# Patient Record
Sex: MALE | Race: WHITE | NOT HISPANIC OR LATINO
[De-identification: names, ages, dates, MRNs, and addresses within clinical notes are randomized per-mention and may not be internally consistent; named-entity substitution may affect disease eponyms.]

---

## 2019-02-11 PROBLEM — Z00.00 ENCOUNTER FOR PREVENTIVE HEALTH EXAMINATION: Status: ACTIVE | Noted: 2019-02-11

## 2019-02-12 ENCOUNTER — APPOINTMENT (OUTPATIENT)
Dept: OPHTHALMOLOGY | Facility: CLINIC | Age: 84
End: 2019-02-12
Payer: MEDICARE

## 2019-02-12 DIAGNOSIS — H53.2 DIPLOPIA: ICD-10-CM

## 2019-02-12 DIAGNOSIS — Z87.891 PERSONAL HISTORY OF NICOTINE DEPENDENCE: ICD-10-CM

## 2019-02-12 PROCEDURE — 92060 SENSORIMOTOR EXAMINATION: CPT

## 2019-02-12 PROCEDURE — 92015 DETERMINE REFRACTIVE STATE: CPT

## 2019-02-12 PROCEDURE — 99204 OFFICE O/P NEW MOD 45 MIN: CPT

## 2019-02-12 PROCEDURE — V2718: CPT

## 2019-02-12 RX ORDER — POTASSIUM GLUCONATE 575(95)MG
TABLET, EXTENDED RELEASE ORAL
Refills: 0 | Status: ACTIVE | COMMUNITY

## 2019-02-12 RX ORDER — ASPIRIN 325 MG/1
325 TABLET, FILM COATED ORAL
Refills: 0 | Status: ACTIVE | COMMUNITY

## 2019-02-12 RX ORDER — ATORVASTATIN CALCIUM 40 MG/1
40 TABLET, FILM COATED ORAL
Refills: 0 | Status: ACTIVE | COMMUNITY

## 2019-02-12 RX ORDER — METOPROLOL TARTRATE 75 MG/1
TABLET, FILM COATED ORAL
Refills: 0 | Status: ACTIVE | COMMUNITY

## 2019-02-12 RX ORDER — RAMIPRIL 5 MG/1
CAPSULE ORAL
Refills: 0 | Status: ACTIVE | COMMUNITY

## 2019-02-12 RX ORDER — TAMSULOSIN HYDROCHLORIDE 0.4 MG/1
CAPSULE ORAL
Refills: 0 | Status: ACTIVE | COMMUNITY

## 2019-02-12 RX ORDER — FINASTERIDE 5 MG/1
5 TABLET, FILM COATED ORAL
Refills: 0 | Status: ACTIVE | COMMUNITY

## 2019-02-12 RX ORDER — FUROSEMIDE 80 MG/1
TABLET ORAL
Refills: 0 | Status: ACTIVE | COMMUNITY

## 2019-02-12 RX ORDER — UBIDECARENONE/VIT E ACET 100MG-5
25 MCG CAPSULE ORAL
Refills: 0 | Status: ACTIVE | COMMUNITY

## 2019-02-15 LAB
ACRM BINDING ANTIBODY: 0 NMOL/L
MUSK ANTIBODY TEST: 0 NMOL/L

## 2019-02-17 LAB — ACHR BLOCK AB SER QL: <15

## 2019-02-20 LAB — ACHR MOD AB SER-ACNC: 24

## 2020-05-11 ENCOUNTER — EMERGENCY (EMERGENCY)
Facility: HOSPITAL | Age: 85
LOS: 1 days | Discharge: HOME | End: 2020-05-11
Attending: EMERGENCY MEDICINE | Admitting: EMERGENCY MEDICINE
Payer: MEDICARE

## 2020-05-11 VITALS
SYSTOLIC BLOOD PRESSURE: 131 MMHG | HEIGHT: 68 IN | DIASTOLIC BLOOD PRESSURE: 85 MMHG | OXYGEN SATURATION: 94 % | TEMPERATURE: 98 F | RESPIRATION RATE: 16 BRPM | WEIGHT: 149.91 LBS | HEART RATE: 89 BPM

## 2020-05-11 VITALS
SYSTOLIC BLOOD PRESSURE: 128 MMHG | OXYGEN SATURATION: 95 % | DIASTOLIC BLOOD PRESSURE: 77 MMHG | RESPIRATION RATE: 18 BRPM | HEART RATE: 87 BPM

## 2020-05-11 LAB
ALBUMIN SERPL ELPH-MCNC: 3.4 G/DL — SIGNIFICANT CHANGE UP (ref 3.3–5)
ALP SERPL-CCNC: 82 U/L — SIGNIFICANT CHANGE UP (ref 40–120)
ALT FLD-CCNC: 20 U/L — SIGNIFICANT CHANGE UP (ref 12–78)
ANION GAP SERPL CALC-SCNC: 8 MMOL/L — SIGNIFICANT CHANGE UP (ref 5–17)
AST SERPL-CCNC: 29 U/L — SIGNIFICANT CHANGE UP (ref 15–37)
BASOPHILS # BLD AUTO: 0.05 K/UL — SIGNIFICANT CHANGE UP (ref 0–0.2)
BASOPHILS NFR BLD AUTO: 0.5 % — SIGNIFICANT CHANGE UP (ref 0–2)
BILIRUB SERPL-MCNC: 0.7 MG/DL — SIGNIFICANT CHANGE UP (ref 0.2–1.2)
BUN SERPL-MCNC: 37 MG/DL — HIGH (ref 7–23)
CALCIUM SERPL-MCNC: 9.3 MG/DL — SIGNIFICANT CHANGE UP (ref 8.5–10.1)
CHLORIDE SERPL-SCNC: 100 MMOL/L — SIGNIFICANT CHANGE UP (ref 96–108)
CO2 SERPL-SCNC: 32 MMOL/L — HIGH (ref 22–31)
CREAT SERPL-MCNC: 1.3 MG/DL — SIGNIFICANT CHANGE UP (ref 0.5–1.3)
EOSINOPHIL # BLD AUTO: 0.15 K/UL — SIGNIFICANT CHANGE UP (ref 0–0.5)
EOSINOPHIL NFR BLD AUTO: 1.5 % — SIGNIFICANT CHANGE UP (ref 0–6)
GLUCOSE SERPL-MCNC: 102 MG/DL — HIGH (ref 70–99)
HCT VFR BLD CALC: 37.9 % — LOW (ref 39–50)
HGB BLD-MCNC: 11.7 G/DL — LOW (ref 13–17)
IMM GRANULOCYTES NFR BLD AUTO: 0.7 % — SIGNIFICANT CHANGE UP (ref 0–1.5)
LYMPHOCYTES # BLD AUTO: 0.76 K/UL — LOW (ref 1–3.3)
LYMPHOCYTES # BLD AUTO: 7.7 % — LOW (ref 13–44)
MCHC RBC-ENTMCNC: 28.7 PG — SIGNIFICANT CHANGE UP (ref 27–34)
MCHC RBC-ENTMCNC: 30.9 GM/DL — LOW (ref 32–36)
MCV RBC AUTO: 93.1 FL — SIGNIFICANT CHANGE UP (ref 80–100)
MONOCYTES # BLD AUTO: 0.86 K/UL — SIGNIFICANT CHANGE UP (ref 0–0.9)
MONOCYTES NFR BLD AUTO: 8.8 % — SIGNIFICANT CHANGE UP (ref 2–14)
NEUTROPHILS # BLD AUTO: 7.93 K/UL — HIGH (ref 1.8–7.4)
NEUTROPHILS NFR BLD AUTO: 80.8 % — HIGH (ref 43–77)
NRBC # BLD: 0 /100 WBCS — SIGNIFICANT CHANGE UP (ref 0–0)
PLATELET # BLD AUTO: 231 K/UL — SIGNIFICANT CHANGE UP (ref 150–400)
POTASSIUM SERPL-MCNC: 4.4 MMOL/L — SIGNIFICANT CHANGE UP (ref 3.5–5.3)
POTASSIUM SERPL-SCNC: 4.4 MMOL/L — SIGNIFICANT CHANGE UP (ref 3.5–5.3)
PROT SERPL-MCNC: 7.5 G/DL — SIGNIFICANT CHANGE UP (ref 6–8.3)
RBC # BLD: 4.07 M/UL — LOW (ref 4.2–5.8)
RBC # FLD: 15.3 % — HIGH (ref 10.3–14.5)
SODIUM SERPL-SCNC: 140 MMOL/L — SIGNIFICANT CHANGE UP (ref 135–145)
TROPONIN I SERPL-MCNC: 0.02 NG/ML — SIGNIFICANT CHANGE UP (ref 0.01–0.04)
WBC # BLD: 9.82 K/UL — SIGNIFICANT CHANGE UP (ref 3.8–10.5)
WBC # FLD AUTO: 9.82 K/UL — SIGNIFICANT CHANGE UP (ref 3.8–10.5)

## 2020-05-11 PROCEDURE — 84484 ASSAY OF TROPONIN QUANT: CPT

## 2020-05-11 PROCEDURE — 99284 EMERGENCY DEPT VISIT MOD MDM: CPT | Mod: 25

## 2020-05-11 PROCEDURE — 93005 ELECTROCARDIOGRAM TRACING: CPT

## 2020-05-11 PROCEDURE — 93010 ELECTROCARDIOGRAM REPORT: CPT

## 2020-05-11 PROCEDURE — 71045 X-RAY EXAM CHEST 1 VIEW: CPT

## 2020-05-11 PROCEDURE — 80053 COMPREHEN METABOLIC PANEL: CPT

## 2020-05-11 PROCEDURE — 36415 COLL VENOUS BLD VENIPUNCTURE: CPT

## 2020-05-11 PROCEDURE — 73502 X-RAY EXAM HIP UNI 2-3 VIEWS: CPT | Mod: 26,RT

## 2020-05-11 PROCEDURE — 99284 EMERGENCY DEPT VISIT MOD MDM: CPT

## 2020-05-11 PROCEDURE — 70450 CT HEAD/BRAIN W/O DYE: CPT

## 2020-05-11 PROCEDURE — 71045 X-RAY EXAM CHEST 1 VIEW: CPT | Mod: 26

## 2020-05-11 PROCEDURE — 70450 CT HEAD/BRAIN W/O DYE: CPT | Mod: 26

## 2020-05-11 PROCEDURE — 73502 X-RAY EXAM HIP UNI 2-3 VIEWS: CPT

## 2020-05-11 PROCEDURE — 85027 COMPLETE CBC AUTOMATED: CPT

## 2020-05-11 RX ORDER — SODIUM CHLORIDE 9 MG/ML
1000 INJECTION INTRAMUSCULAR; INTRAVENOUS; SUBCUTANEOUS ONCE
Refills: 0 | Status: COMPLETED | OUTPATIENT
Start: 2020-05-11 | End: 2020-05-11

## 2020-05-11 RX ADMIN — SODIUM CHLORIDE 1000 MILLILITER(S): 9 INJECTION INTRAMUSCULAR; INTRAVENOUS; SUBCUTANEOUS at 16:07

## 2020-05-11 NOTE — ED ADULT NURSE NOTE - OBJECTIVE STATEMENT
received pt in bed #t2 Pt A&O BIBA from the [facility] states he was getting up & fell states he did feel slightly dizzy Pt w/ skin tear to right arm Right hip "feels a little tight" CM placed EKG done Pt denies LOC

## 2020-05-11 NOTE — ED PROVIDER NOTE - MUSCULOSKELETAL, MLM
Spine appears normal, range of motion is not limited, no muscle or joint tenderness nrom of hip normal strength sensation pulses + skin tear on right forearm with nrom of elbow

## 2020-05-11 NOTE — ED ADULT NURSE NOTE - NSIMPLEMENTINTERV_GEN_ALL_ED
Implemented All Fall Risk Interventions:  [place] to call system. Call bell, personal items and telephone within reach. Instruct patient to call for assistance. Room bathroom lighting operational. Non-slip footwear when patient is off stretcher. Physically safe environment: no spills, clutter or unnecessary equipment. Stretcher in lowest position, wheels locked, appropriate side rails in place. Provide visual cue, wrist band, yellow gown, etc. Monitor gait and stability. Monitor for mental status changes and reorient to person, place, and time. Review medications for side effects contributing to fall risk. Reinforce activity limits and safety measures with patient and family.

## 2020-05-11 NOTE — ED PROVIDER NOTE - PATIENT PORTAL LINK FT
You can access the FollowMyHealth Patient Portal offered by [facility] by registering at the following website: [email or web address]. By joining [facility]’s FollowMyHealth portal, you will also be able to view your health information using other applications (apps) compatible with our system.

## 2020-05-11 NOTE — ED PROVIDER NOTE - PROGRESS NOTE DETAILS
labs wnl ct head wnl xray + right midlung nodule pt made aware + orthostatics given fluids pt ambulated with walker feeling better stable for d/c

## 2020-05-11 NOTE — ED PROVIDER NOTE - NSFOLLOWUPINSTRUCTIONS_ED_ALL_ED_FT
Drink plenty of fluids follow up with pcp regarding lung nodule. Return to er for any worsening symptoms

## 2020-05-11 NOTE — ED PROVIDER NOTE - CLINICAL SUMMARY MEDICAL DECISION MAKING FREE TEXT BOX
Pt is a 89 yo male s/p fall will get ekg labs ct head cxr and hip xray check orthostatics clean and dress skin tear Pt is a 89 yo male s/p fall will get ekg labs ct head cxr and hip xray check orthostatics clean and dress skin tear  wound cleaned bacitracin and dressing applied

## 2020-05-11 NOTE — ED ADULT NURSE NOTE - PMH
Anxiety    Atrial fibrillation    CAD (coronary artery disease)    High cholesterol    HTN (hypertension)

## 2020-05-11 NOTE — ED PROVIDER NOTE - OBJECTIVE STATEMENT
Pt is a 89 yo male with pmhx of HTN HLD BPH on asa BIBEMS from assisted living s/p fall. Pt states he was sitting got up to grab walker and back of right shoe came off and had slight second of dizziness and fell onto right side no loc no neck pain no abdominal pain no nvd no headache syncope blurry vision no chest pain no sob no numbness tingling no weakness. Pt has tear on right forearm and c/o of right hip tightness. no covid like symptoms

## 2020-05-15 ENCOUNTER — INPATIENT (INPATIENT)
Facility: HOSPITAL | Age: 85
LOS: 6 days | Discharge: HOME | DRG: 536 | End: 2020-05-22
Attending: INTERNAL MEDICINE | Admitting: INTERNAL MEDICINE
Payer: MEDICARE

## 2020-05-15 VITALS
HEIGHT: 68 IN | DIASTOLIC BLOOD PRESSURE: 77 MMHG | OXYGEN SATURATION: 96 % | HEART RATE: 89 BPM | SYSTOLIC BLOOD PRESSURE: 117 MMHG | RESPIRATION RATE: 18 BRPM | TEMPERATURE: 98 F | WEIGHT: 154.98 LBS

## 2020-05-15 DIAGNOSIS — S72.111A DISPLACED FRACTURE OF GREATER TROCHANTER OF RIGHT FEMUR, INITIAL ENCOUNTER FOR CLOSED FRACTURE: ICD-10-CM

## 2020-05-15 LAB
ANION GAP SERPL CALC-SCNC: 8 MMOL/L — SIGNIFICANT CHANGE UP (ref 5–17)
APTT BLD: 31.2 SEC — SIGNIFICANT CHANGE UP (ref 28.5–37)
BASOPHILS # BLD AUTO: 0.05 K/UL — SIGNIFICANT CHANGE UP (ref 0–0.2)
BASOPHILS NFR BLD AUTO: 0.6 % — SIGNIFICANT CHANGE UP (ref 0–2)
BUN SERPL-MCNC: 34 MG/DL — HIGH (ref 7–23)
CALCIUM SERPL-MCNC: 9.2 MG/DL — SIGNIFICANT CHANGE UP (ref 8.5–10.1)
CHLORIDE SERPL-SCNC: 99 MMOL/L — SIGNIFICANT CHANGE UP (ref 96–108)
CO2 SERPL-SCNC: 33 MMOL/L — HIGH (ref 22–31)
CREAT SERPL-MCNC: 1.3 MG/DL — SIGNIFICANT CHANGE UP (ref 0.5–1.3)
EOSINOPHIL # BLD AUTO: 0.15 K/UL — SIGNIFICANT CHANGE UP (ref 0–0.5)
EOSINOPHIL NFR BLD AUTO: 1.8 % — SIGNIFICANT CHANGE UP (ref 0–6)
GLUCOSE SERPL-MCNC: 99 MG/DL — SIGNIFICANT CHANGE UP (ref 70–99)
HCT VFR BLD CALC: 35.4 % — LOW (ref 39–50)
HGB BLD-MCNC: 11.2 G/DL — LOW (ref 13–17)
IMM GRANULOCYTES NFR BLD AUTO: 0.6 % — SIGNIFICANT CHANGE UP (ref 0–1.5)
INR BLD: 1.14 RATIO — SIGNIFICANT CHANGE UP (ref 0.88–1.16)
LYMPHOCYTES # BLD AUTO: 0.8 K/UL — LOW (ref 1–3.3)
LYMPHOCYTES # BLD AUTO: 9.7 % — LOW (ref 13–44)
MCHC RBC-ENTMCNC: 28.9 PG — SIGNIFICANT CHANGE UP (ref 27–34)
MCHC RBC-ENTMCNC: 31.6 GM/DL — LOW (ref 32–36)
MCV RBC AUTO: 91.5 FL — SIGNIFICANT CHANGE UP (ref 80–100)
MONOCYTES # BLD AUTO: 0.86 K/UL — SIGNIFICANT CHANGE UP (ref 0–0.9)
MONOCYTES NFR BLD AUTO: 10.4 % — SIGNIFICANT CHANGE UP (ref 2–14)
NEUTROPHILS # BLD AUTO: 6.36 K/UL — SIGNIFICANT CHANGE UP (ref 1.8–7.4)
NEUTROPHILS NFR BLD AUTO: 76.9 % — SIGNIFICANT CHANGE UP (ref 43–77)
NRBC # BLD: 0 /100 WBCS — SIGNIFICANT CHANGE UP (ref 0–0)
PLATELET # BLD AUTO: 197 K/UL — SIGNIFICANT CHANGE UP (ref 150–400)
POTASSIUM SERPL-MCNC: 4 MMOL/L — SIGNIFICANT CHANGE UP (ref 3.5–5.3)
POTASSIUM SERPL-SCNC: 4 MMOL/L — SIGNIFICANT CHANGE UP (ref 3.5–5.3)
PROTHROM AB SERPL-ACNC: 12.8 SEC — SIGNIFICANT CHANGE UP (ref 10–12.9)
RBC # BLD: 3.87 M/UL — LOW (ref 4.2–5.8)
RBC # FLD: 15.7 % — HIGH (ref 10.3–14.5)
SODIUM SERPL-SCNC: 140 MMOL/L — SIGNIFICANT CHANGE UP (ref 135–145)
WBC # BLD: 8.27 K/UL — SIGNIFICANT CHANGE UP (ref 3.8–10.5)
WBC # FLD AUTO: 8.27 K/UL — SIGNIFICANT CHANGE UP (ref 3.8–10.5)

## 2020-05-15 PROCEDURE — 72192 CT PELVIS W/O DYE: CPT | Mod: 26

## 2020-05-15 PROCEDURE — 73562 X-RAY EXAM OF KNEE 3: CPT | Mod: 26,RT

## 2020-05-15 PROCEDURE — 73502 X-RAY EXAM HIP UNI 2-3 VIEWS: CPT | Mod: 26,RT

## 2020-05-15 PROCEDURE — 93010 ELECTROCARDIOGRAM REPORT: CPT

## 2020-05-15 PROCEDURE — 71045 X-RAY EXAM CHEST 1 VIEW: CPT | Mod: 26

## 2020-05-15 RX ORDER — METOPROLOL TARTRATE 50 MG
1 TABLET ORAL
Qty: 0 | Refills: 0 | DISCHARGE

## 2020-05-15 RX ORDER — ASPIRIN/CALCIUM CARB/MAGNESIUM 324 MG
81 TABLET ORAL DAILY
Refills: 0 | Status: DISCONTINUED | OUTPATIENT
Start: 2020-05-15 | End: 2020-05-22

## 2020-05-15 RX ORDER — ATORVASTATIN CALCIUM 80 MG/1
40 TABLET, FILM COATED ORAL AT BEDTIME
Refills: 0 | Status: DISCONTINUED | OUTPATIENT
Start: 2020-05-15 | End: 2020-05-22

## 2020-05-15 RX ORDER — ZOLPIDEM TARTRATE 10 MG/1
5 TABLET ORAL AT BEDTIME
Refills: 0 | Status: DISCONTINUED | OUTPATIENT
Start: 2020-05-15 | End: 2020-05-15

## 2020-05-15 RX ORDER — ZOLPIDEM TARTRATE 10 MG/1
1 TABLET ORAL
Qty: 0 | Refills: 0 | DISCHARGE

## 2020-05-15 RX ORDER — CHOLECALCIFEROL (VITAMIN D3) 125 MCG
0 CAPSULE ORAL
Qty: 0 | Refills: 0 | DISCHARGE

## 2020-05-15 RX ORDER — OXYCODONE AND ACETAMINOPHEN 5; 325 MG/1; MG/1
1 TABLET ORAL EVERY 6 HOURS
Refills: 0 | Status: DISCONTINUED | OUTPATIENT
Start: 2020-05-15 | End: 2020-05-15

## 2020-05-15 RX ORDER — ALPRAZOLAM 0.25 MG
0.25 TABLET ORAL DAILY
Refills: 0 | Status: DISCONTINUED | OUTPATIENT
Start: 2020-05-15 | End: 2020-05-15

## 2020-05-15 RX ORDER — MORPHINE SULFATE 50 MG/1
2 CAPSULE, EXTENDED RELEASE ORAL EVERY 6 HOURS
Refills: 0 | Status: DISCONTINUED | OUTPATIENT
Start: 2020-05-15 | End: 2020-05-15

## 2020-05-15 RX ORDER — ACETAMINOPHEN 500 MG
500 TABLET ORAL
Qty: 0 | Refills: 0 | DISCHARGE

## 2020-05-15 RX ORDER — RAMIPRIL 5 MG
0 CAPSULE ORAL
Qty: 0 | Refills: 0 | DISCHARGE

## 2020-05-15 RX ORDER — FUROSEMIDE 40 MG
40 TABLET ORAL DAILY
Refills: 0 | Status: DISCONTINUED | OUTPATIENT
Start: 2020-05-15 | End: 2020-05-22

## 2020-05-15 RX ORDER — ACETAMINOPHEN 500 MG
650 TABLET ORAL EVERY 6 HOURS
Refills: 0 | Status: DISCONTINUED | OUTPATIENT
Start: 2020-05-15 | End: 2020-05-22

## 2020-05-15 RX ORDER — FINASTERIDE 5 MG/1
5 TABLET, FILM COATED ORAL DAILY
Refills: 0 | Status: DISCONTINUED | OUTPATIENT
Start: 2020-05-15 | End: 2020-05-22

## 2020-05-15 RX ORDER — ALPRAZOLAM 0.25 MG
0.5 TABLET ORAL
Qty: 0 | Refills: 0 | DISCHARGE

## 2020-05-15 RX ORDER — LISINOPRIL 2.5 MG/1
5 TABLET ORAL DAILY
Refills: 0 | Status: DISCONTINUED | OUTPATIENT
Start: 2020-05-15 | End: 2020-05-22

## 2020-05-15 RX ORDER — FINASTERIDE 5 MG/1
1 TABLET, FILM COATED ORAL
Qty: 0 | Refills: 0 | DISCHARGE

## 2020-05-15 RX ORDER — METOPROLOL TARTRATE 50 MG
25 TABLET ORAL DAILY
Refills: 0 | Status: DISCONTINUED | OUTPATIENT
Start: 2020-05-15 | End: 2020-05-22

## 2020-05-15 RX ORDER — NITROGLYCERIN 6.5 MG
1 CAPSULE, EXTENDED RELEASE ORAL
Qty: 0 | Refills: 0 | DISCHARGE

## 2020-05-15 RX ORDER — FUROSEMIDE 40 MG
1 TABLET ORAL
Qty: 0 | Refills: 0 | DISCHARGE

## 2020-05-15 NOTE — H&P ADULT - NSHPLABSRESULTS_GEN_ALL_CORE
Lab Results:  CBC  CBC Full  -  ( 16 May 2020 07:36 )  WBC Count : 6.38 K/uL  RBC Count : 3.32 M/uL  Hemoglobin : 9.6 g/dL  Hematocrit : 30.8 %  Platelet Count - Automated : 191 K/uL  Mean Cell Volume : 92.8 fl  Mean Cell Hemoglobin : 28.9 pg  Mean Cell Hemoglobin Concentration : 31.2 gm/dL  Auto Neutrophil # : x  Auto Lymphocyte # : x  Auto Monocyte # : x  Auto Eosinophil # : x  Auto Basophil # : x  Auto Neutrophil % : x  Auto Lymphocyte % : x  Auto Monocyte % : x  Auto Eosinophil % : x  Auto Basophil % : x    .		Differential:	[] Automated		[] Manual  Chemistry                        9.6    6.38  )-----------( 191      ( 16 May 2020 07:36 )             30.8     05-16    142  |  102  |  28<H>  ----------------------------<  86  3.5   |  32<H>  |  0.92    Ca    8.7      16 May 2020 07:36    TPro  6.1  /  Alb  2.7<L>  /  TBili  1.0  /  DBili  x   /  AST  24  /  ALT  15  /  AlkPhos  66  05-16    LIVER FUNCTIONS - ( 16 May 2020 07:36 )  Alb: 2.7 g/dL / Pro: 6.1 g/dL / ALK PHOS: 66 U/L / ALT: 15 U/L / AST: 24 U/L / GGT: x           PT/INR - ( 15 May 2020 21:40 )   PT: 12.8 sec;   INR: 1.14 ratio         PTT - ( 15 May 2020 21:40 )  PTT:31.2 sec          MICROBIOLOGY/CULTURES:      RADIOLOGY RESULTS: reviewed

## 2020-05-15 NOTE — ED PROVIDER NOTE - CARE PLAN
Principal Discharge DX:	Closed displaced fracture of greater trochanter of right femur, initial encounter

## 2020-05-15 NOTE — ED PROVIDER NOTE - PHYSICAL EXAMINATION
Constitutional: Awake, Alert, non-toxic. NAD. Well appearing, well nourished.   HEAD: Normocephalic, atraumatic.   EYES: PERRL, EOM intact, conjunctiva and sclera are clear bilaterally. No raccoon eyes.   ENT: No gutierres sign. No rhinorrhea, normal pharynx, patent, no tonsillar exudate or enlargement, mucous membranes pink/moist, no erythema, no drooling or stridor.   NECK: Supple, non-tender  BACK: No midline or paraspinal TTP of cervical/thoracic/lumbar spine, FROM. No ecchymosis or hematomas.   CARDIOVASCULAR: Normal S1, S2; regular rate and rhythm.  RESPIRATORY: Normal respiratory effort; breath sounds CTAB, no wheezes, rhonchi, or rales. Speaking in full sentences. No accessory muscle use.   ABDOMEN: Soft; non-tender, non-distended.   EXTREMITIES: Full passive and active ROM in all extremities; (+) right hip TTP, limited ROM hip ROM, (+) ecchymosis, no distal leg TTP, FROM Knee, no swelling or ecchymosis of knee; distal pulses palpable and symmetric, no edema, no crepitus or step off, (+) unable to bear weight/ambulate   SKIN: Warm, dry; good skin turgor, no apparent lesions or rashes, no ecchymosis, brisk capillary refill.  NEURO: A&O x3. Sensory and motor functions are grossly intact. Speech is normal. Appearance and judgement seem appropriate for gender and age. No neurological deficits. Neurovascular sensation intact motor function 5/5 of upper and lower extremities, CN II-XII grossly intact, absent pronator drift, intact cerebellar function. Speech clear, without articulation or word-finding difficulties. Eyes- PERRL bilaterally. EOMs in tact. No nystagmus. No facial droop. Constitutional: Awake, Alert, non-toxic. NAD. Well appearing, well nourished.   HEAD: Normocephalic, atraumatic.   EYES: PERRL, EOM intact, conjunctiva and sclera are clear bilaterally. No raccoon eyes.   ENT: No gutierres sign. No rhinorrhea, normal pharynx, patent, no tonsillar exudate or enlargement, mucous membranes pink/moist, no erythema, no drooling or stridor.   NECK: Supple, non-tender  BACK: No midline or paraspinal TTP of cervical/thoracic/lumbar spine, FROM. No ecchymosis or hematomas.   CARDIOVASCULAR: Normal S1, S2; regular rate and rhythm.  RESPIRATORY: Normal respiratory effort; breath sounds CTAB, no wheezes, rhonchi, or rales. Speaking in full sentences. No accessory muscle use.   ABDOMEN: Soft; non-tender, non-distended.   EXTREMITIES: Full passive and active ROM in all extremities; (+) right hip TTP, limited ROM hip ROM, (+) ecchymosis, no distal leg TTP, FROM Knee, no swelling or ecchymosis of knee; distal pulses palpable and symmetric, no edema, no crepitus or step off, (+) unable to bear weight/ambulate, (+) right lateral forearm skin tear without surrounding erythema or discharge.   SKIN: Warm, dry; good skin turgor, no apparent lesions or rashes, no ecchymosis, brisk capillary refill.  NEURO: A&O x3. Sensory and motor functions are grossly intact. Speech is normal. Appearance and judgement seem appropriate for gender and age. No neurological deficits. Neurovascular sensation intact motor function 5/5 of upper and lower extremities, CN II-XII grossly intact, absent pronator drift, intact cerebellar function. No facial droop.

## 2020-05-15 NOTE — ED ADULT TRIAGE NOTE - CHIEF COMPLAINT QUOTE
Pt. brought to ED via EMS from assisted living for fall x 3 days ago. Pt. complaining of  right knee pain, per patient non-wt. bearing on right extremity.

## 2020-05-15 NOTE — H&P ADULT - NSHPPHYSICALEXAM_GEN_ALL_CORE
General: WN/WD NAD  PERRLA  Neurology: A&Ox3,   Respiratory: CTA B/L  CV: RRR, S1S2, no murmurs, rubs or gallops  Abdominal: Soft, NT, ND +BS, Last BM  Extremities: No edema, + peripheral pulses  Skin Normal

## 2020-05-15 NOTE — ED ADULT NURSE NOTE - OBJECTIVE STATEMENT
patient came in ED from [facility]  with c/o right groin/hip pain, unable to bear weight on the right leg. alert and oriented x 4. patient stated he fell 4 days ago, came to ED and was discharged. at the time of the fall patient sustained, right elbow skin mo, left elbow bruise, right hip/lateral thigh bruise, right knee abrasion, right hip/groin pain. patient assisted in standing position, patient noted unable to make a step and bear weight.

## 2020-05-15 NOTE — ED PROVIDER NOTE - CLINICAL SUMMARY MEDICAL DECISION MAKING FREE TEXT BOX
BIBA from [facility] due to right leg/hip pain. Recent ED visit in which worked up and discharged. pt unable to ambulate/bear weight. PLan includes CT pelvis r/o fx, admission.

## 2020-05-15 NOTE — H&P ADULT - NSICDXPASTMEDICALHX_GEN_ALL_CORE_FT
PAST MEDICAL HISTORY:  Anxiety     Atrial fibrillation     CAD (coronary artery disease)     High cholesterol     HTN (hypertension)

## 2020-05-15 NOTE — ED PROVIDER NOTE - ATTENDING CONTRIBUTION TO CARE
chart from er visit 4 d ago reviewed   pt states he fell from dizzy spell 4 days ago has had pian in r hip since worse and now unable to ambulate or bear any weight he lives in an independent living facility since then he has felt pain in his hip and knee he has hx of avr and is awaiting tavr for aortic valvular disease  he has a bruise from that fall over hiship and a contusion on left elbow and skin tear r arm  on eval;  plesant male nad awake alert nad  heen nc at [facility]  cor loud blowing natalia  chest cta  abd soft nt nt  back normal  ext as noted above no shortening of leg or hip neuro vasc intact contusions and skintears  plan ro fx with xra if neg xray will ct pelvis   eithertway will need toadmit for rehab as pt lives independently.

## 2020-05-15 NOTE — ED PROVIDER NOTE - OBJECTIVE STATEMENT
89 y/o male with PMHx Anxiety, Atrial fibrillation, CAD, High cholesterol, HTN BIBA from [facility] due to right leg pain. Pt reports fall 4 days ago. Pt reports he is unable to ambulate due to pain. Pt also reports new knee pain, no recent injury. Pt notes he uses a walker at home but unable to bear weight. pt describes pain as aching, non-radiating, and currently 3/10 at rest. pt denies recent fall, fever, chills, cp, SOB, numbness/weakness, cough, or any other complaints. 89 y/o male with PMHx Anxiety, Atrial fibrillation, CAD, High cholesterol, HTN BIBA from [facility] due to right leg pain. Pt reports fall 4 days ago. Pt reports he is unable to ambulate due to pain. Pt also reports new knee pain, no recent injury. Pt notes he uses a walker at home but unable to bear weight. pt describes pain as aching, non-radiating, and currently 3/10 at rest. pt denies recent fall, fever, chills, cp, SOB, numbness/weakness, cough, HA, or any other complaints.

## 2020-05-16 DIAGNOSIS — S72.111A DISPLACED FRACTURE OF GREATER TROCHANTER OF RIGHT FEMUR, INITIAL ENCOUNTER FOR CLOSED FRACTURE: ICD-10-CM

## 2020-05-16 DIAGNOSIS — I48.91 UNSPECIFIED ATRIAL FIBRILLATION: ICD-10-CM

## 2020-05-16 DIAGNOSIS — I25.10 ATHEROSCLEROTIC HEART DISEASE OF NATIVE CORONARY ARTERY WITHOUT ANGINA PECTORIS: ICD-10-CM

## 2020-05-16 PROBLEM — F41.9 ANXIETY DISORDER, UNSPECIFIED: Chronic | Status: ACTIVE | Noted: 2020-05-11

## 2020-05-16 PROBLEM — E78.00 PURE HYPERCHOLESTEROLEMIA, UNSPECIFIED: Chronic | Status: ACTIVE | Noted: 2020-05-11

## 2020-05-16 PROBLEM — I10 ESSENTIAL (PRIMARY) HYPERTENSION: Chronic | Status: ACTIVE | Noted: 2020-05-11

## 2020-05-16 LAB
ALBUMIN SERPL ELPH-MCNC: 2.7 G/DL — LOW (ref 3.3–5)
ALP SERPL-CCNC: 66 U/L — SIGNIFICANT CHANGE UP (ref 40–120)
ALT FLD-CCNC: 15 U/L — SIGNIFICANT CHANGE UP (ref 12–78)
ANION GAP SERPL CALC-SCNC: 8 MMOL/L — SIGNIFICANT CHANGE UP (ref 5–17)
AST SERPL-CCNC: 24 U/L — SIGNIFICANT CHANGE UP (ref 15–37)
BILIRUB SERPL-MCNC: 1 MG/DL — SIGNIFICANT CHANGE UP (ref 0.2–1.2)
BUN SERPL-MCNC: 28 MG/DL — HIGH (ref 7–23)
CALCIUM SERPL-MCNC: 8.7 MG/DL — SIGNIFICANT CHANGE UP (ref 8.5–10.1)
CHLORIDE SERPL-SCNC: 102 MMOL/L — SIGNIFICANT CHANGE UP (ref 96–108)
CO2 SERPL-SCNC: 32 MMOL/L — HIGH (ref 22–31)
CREAT SERPL-MCNC: 0.92 MG/DL — SIGNIFICANT CHANGE UP (ref 0.5–1.3)
GLUCOSE SERPL-MCNC: 86 MG/DL — SIGNIFICANT CHANGE UP (ref 70–99)
HCT VFR BLD CALC: 30.8 % — LOW (ref 39–50)
HGB BLD-MCNC: 9.6 G/DL — LOW (ref 13–17)
MCHC RBC-ENTMCNC: 28.9 PG — SIGNIFICANT CHANGE UP (ref 27–34)
MCHC RBC-ENTMCNC: 31.2 GM/DL — LOW (ref 32–36)
MCV RBC AUTO: 92.8 FL — SIGNIFICANT CHANGE UP (ref 80–100)
NRBC # BLD: 0 /100 WBCS — SIGNIFICANT CHANGE UP (ref 0–0)
PLATELET # BLD AUTO: 191 K/UL — SIGNIFICANT CHANGE UP (ref 150–400)
POTASSIUM SERPL-MCNC: 3.5 MMOL/L — SIGNIFICANT CHANGE UP (ref 3.5–5.3)
POTASSIUM SERPL-SCNC: 3.5 MMOL/L — SIGNIFICANT CHANGE UP (ref 3.5–5.3)
PROT SERPL-MCNC: 6.1 G/DL — SIGNIFICANT CHANGE UP (ref 6–8.3)
RBC # BLD: 3.32 M/UL — LOW (ref 4.2–5.8)
RBC # FLD: 15.4 % — HIGH (ref 10.3–14.5)
SARS-COV-2 RNA SPEC QL NAA+PROBE: SIGNIFICANT CHANGE UP
SODIUM SERPL-SCNC: 142 MMOL/L — SIGNIFICANT CHANGE UP (ref 135–145)
WBC # BLD: 6.38 K/UL — SIGNIFICANT CHANGE UP (ref 3.8–10.5)
WBC # FLD AUTO: 6.38 K/UL — SIGNIFICANT CHANGE UP (ref 3.8–10.5)

## 2020-05-16 RX ORDER — HEPARIN SODIUM 5000 [USP'U]/ML
5000 INJECTION INTRAVENOUS; SUBCUTANEOUS EVERY 8 HOURS
Refills: 0 | Status: DISCONTINUED | OUTPATIENT
Start: 2020-05-16 | End: 2020-05-22

## 2020-05-16 RX ADMIN — Medication 25 MILLIGRAM(S): at 06:07

## 2020-05-16 RX ADMIN — Medication 40 MILLIGRAM(S): at 06:07

## 2020-05-16 RX ADMIN — HEPARIN SODIUM 5000 UNIT(S): 5000 INJECTION INTRAVENOUS; SUBCUTANEOUS at 16:26

## 2020-05-16 RX ADMIN — HEPARIN SODIUM 5000 UNIT(S): 5000 INJECTION INTRAVENOUS; SUBCUTANEOUS at 20:28

## 2020-05-16 RX ADMIN — ATORVASTATIN CALCIUM 40 MILLIGRAM(S): 80 TABLET, FILM COATED ORAL at 20:28

## 2020-05-16 RX ADMIN — Medication 81 MILLIGRAM(S): at 12:10

## 2020-05-16 RX ADMIN — LISINOPRIL 5 MILLIGRAM(S): 2.5 TABLET ORAL at 06:07

## 2020-05-16 RX ADMIN — FINASTERIDE 5 MILLIGRAM(S): 5 TABLET, FILM COATED ORAL at 12:10

## 2020-05-16 NOTE — PROGRESS NOTE ADULT - ASSESSMENT
89 y/o male with PMHx Anxiety, Atrial fibrillation, CAD, High cholesterol, HTN BIBA from [facility] due to right leg pain. Pt reports fall 4 days ago. Pt reports he is unable to ambulate due to pain. Pt also reports new knee pain, no recent injury. Pt notes he uses a walker at home but unable to bear weight. pt describes pain as aching, non-radiating, and currently 3/10 at rest. pt denies recent fall, fever, chills, cp, SOB, numbness/weakness, cough, HA, or any other complaints.     Problem/Plan - 1:  ·  Problem: Closed displaced fracture of greater trochanter of right femur, initial encounter.  Plan: OR as per ortho   pain control  OR monday   will monitor.     Problem/Plan - 2:  ·  Problem: Atrial fibrillation.  Plan: telemonitor cards fu   PPM interrogation.     Problem/Plan - 3:  ·  Problem: CAD (coronary artery disease).  Plan: telemonitor    home meds  cardiology fu for clearance for procedure     medical clearance pending cardiac clearance

## 2020-05-16 NOTE — CHART NOTE - NSCHARTNOTEFT_GEN_A_CORE
Cardiology addendum:    Spoke with Dr Ines ruiz's electrophysiologist.  PPM is [employer] [place] last interrogated 11/18/19 with normal function and 3.5 years battery life remaining

## 2020-05-16 NOTE — CONSULT NOTE ADULT - SUBJECTIVE AND OBJECTIVE BOX
Orthopaedic Surgery Consult Note    Pt is a 89y Male presenting with R hip pain s/p mechanical fall 4 days ago. Pt was seen in ED on 5/11 but sent home with negative imaging. Pt returns today due to increased pain and inability to ambulate. Pt is a community ambulator at baseline. Pt has been unable to bear weight on affected extremity since this AM. Pt denies numbness, tingling, or paresthesias in affected limb. Pt denies headstrike or LOC and denies any other orthopaedic injuries at this time. Pt takes aspirin 81 QD. Denies fevers, dizziness, CP, SOB, N/V, calf pain.    PMHx:  Displaced fracture of greater trochanter of right femur, initial encounter for closed fracture  No pertinent family history in first degree relatives  MEWS Score  HTN (hypertension)  Atrial fibrillation  CAD (coronary artery disease)  High cholesterol  Anxiety  Closed displaced fracture of greater trochanter of right femur, initial encounter  No significant past surgical history  RIGHT KNEE PAIN  4    PSHx:    Allergies:  IV Contrast dye (Nausea)  No Known Drug Allergies    Medications:  acetaminophen   Tablet .. 650 milliGRAM(s) Oral every 6 hours PRN  ALPRAZolam 0.25 milliGRAM(s) Oral daily PRN  aspirin  chewable 81 milliGRAM(s) Oral daily  atorvastatin 40 milliGRAM(s) Oral at bedtime  finasteride 5 milliGRAM(s) Oral daily  furosemide    Tablet 40 milliGRAM(s) Oral daily  lisinopril 5 milliGRAM(s) Oral daily  metoprolol succinate ER 25 milliGRAM(s) Oral daily  morphine  - Injectable 2 milliGRAM(s) IV Push every 6 hours PRN  oxycodone    5 mG/acetaminophen 325 mG 1 Tablet(s) Oral every 6 hours PRN  zolpidem 5 milliGRAM(s) Oral at bedtime PRN    Social: Denies smoking, EtOH, illicit drug use.    Labs:                        11.2   8.27  )-----------( 197      ( 15 May 2020 21:39 )             35.4     05-15    140  |  99  |  34<H>  ----------------------------<  99  4.0   |  33<H>  |  1.30    Ca    9.2      15 May 2020 21:39      PT/INR - ( 15 May 2020 21:40 )   PT: 12.8 sec;   INR: 1.14 ratio         PTT - ( 15 May 2020 21:40 )  PTT:31.2 sec    Imaging:    Vitals:  T(C): 36.6 (05-15-20 @ 21:28), Max: 36.7 (05-15-20 @ 20:35)  HR: 86 (05-15-20 @ 21:28) (86 - 89)  BP: 130/69 (05-15-20 @ 21:28) (117/77 - 130/69)  RR: 18 (05-15-20 @ 21:28) (18 - 18)  SpO2: 96% (05-15-20 @ 21:28) (96% - 96%)    Physical Exam:  Gen: NAD, AAOx3    RLE:   Skin intact  No edema, erythema, ecchymosis  No gross deformity  No bony TTP of Knee/Foot/Toes  Unable to SLR  Mildly tender with logroll  +EHL/FHL/TA/GS  SILT L2-S1  +DP/PT Pulses  Compartments soft and compressible  No calf TTP B/L    Secondary Assessment:  NC/AT, NTTP of clavicles, NTTP of C-,T-,L-Spine, NTTP of Pelvis  UEs: NTTP of Shoulders, Elbows, Wrists, Hands; NT with AROM/PROM of Shoulders, Elbows, Wrists, Hands; AIN/PIN/Med/Uln/Msc/Rad/Ax intact  LLE: Able to SLR, NT with Log Roll, NT with Heel Strike, NTTP of Hip, Knee, Ankle, Foot; NT with AROM/PROM of Hip, Knee, Ankle, Foot; Q/H/Gsc/TA/EHL/FHL intact    Pt is a 89y Male with a R greater trochanter fracture    -Concern for extension into lesser trochanter requiring surgery; Need further imaging for evaluation  -MRI R hip ordered but patient has PPM; if PPM MRI compatible pt can receive study tomorrow, if unable to receive MRI will evaluate for other imaging studies  -Closed fracture, NV intact  -Pain control  -NWB affected extremity  -Bedrest - strict  -Hold all chemical DVT prophylaxis after midnight  -SCDs  -Will discuss with Dr. Dickey and advise if plan changes    Desmond Grimaldo D.O.  PGY-1 Orthopaedic Surgery

## 2020-05-16 NOTE — DIETITIAN INITIAL EVALUATION ADULT. - OTHER INFO
89 y/o male with PMHx Anxiety, Atrial fibrillation, CAD, High cholesterol, HTN BIBA from [facility] due to right leg pain admitted for R greater trochanter fracture.  Pt to go for NM spect bone scan today. Reports tolerated bfst well, ate OK.  Per pt he has lost 4 inches in height.  Pt reports has had gradual weight loss over years.  Used to be ~198#.  Current wt of 155# has been that way for awhile. Pt states had lost some weight in the past from prior hospitalizations/procedures as well.  NFPE conducted- + signs of moderate to severe muscle wasting at temples, clavicles, shoulders, orbital area, +fat loss of buccal and orbital fat pads, triceps.  Pt meets criteria for moderate malnutrition related to reduced oral intake/advanced age.

## 2020-05-16 NOTE — CHART NOTE - NSCHARTNOTEFT_GEN_A_CORE
Upon Nutritional Assessment by the Registered Dietitian your patient was determined to meet criteria / has evidence of the following diagnosis/diagnoses:          [ ]  Mild Protein Calorie Malnutrition        [x]  Moderate Protein Calorie Malnutrition        [ ] Severe Protein Calorie Malnutrition        [ ] Unspecified Protein Calorie Malnutrition        [ ] Underweight / BMI <19        [ ] Morbid Obesity / BMI > 40      Findings as based on:  •  Comprehensive nutrition assessment and consultation  •  Nutrition Focused Physical Exam conducted with + findings of moderate-severe muscle wasting at temples, clavicles, shoulders, orbital and buccal areas;  +fat loss at triceps, orbital and buccal pads.        Treatment:    The following diet has been recommended:  Continue regular diet given advanced age  Add Ensure Enlive 8oz bid  Cut foods into small pieces due to some recent complaints of dysphagia to food and pills; consider swallow eval  Recommend daily MVI and Vit C 500mg bid for wound healing (pressure injuries)    PROVIDER Section:     By signing this assessment you are acknowledging and agree with the diagnosis/diagnoses assigned by the Registered Dietitian    Comments:

## 2020-05-16 NOTE — CHART NOTE - NSCHARTNOTEFT_GEN_A_CORE
Discussed case with attending Dr. Dickey. Plan updated as follows:    Pt may ambulate as tolerated with partial weight bearing 50% on RLE with abduction precautions  Decreased sensitivity of nuclear medicine bone scan picking up extension of fracture, therefore bone scan cancelled  Pt is orthopaedically stable for discharge  No surgical intervention warranted at this time  Please re-consult if necessary  Attending Dr. Dickey aware of plan and in agreement    Desmond Grimaldo, DO PGY1  Orthopaedic Surgery

## 2020-05-16 NOTE — CONSULT NOTE ADULT - ASSESSMENT
CAD- without anginal symptoms  PAF- on beta blocker for rate control. Not on AC . His regular cardiologist is Dr Pat  HTN- controlled  PPM- pt unsure of brand. Pt's electrophysiologist Dr Chacon. Awaiting call back   Echo ordered- assess LV function, AVR CAD- without anginal symptoms  PAF- on beta blocker for rate control. Not on AC . His regular cardiologist is Dr Pat  HTN- controlled  PPM- pt unsure of brand. Pt's electrophysiologist Dr Chacon. Awaiting call back   Echo ordered- assess LV function, AVR      Addendum: spoke with Dr Chacon- PPM is [employer] last interrogated 11/18/19 with normal function and 3.5 years battery life remaining. PPM is                     NOT MRI compatible

## 2020-05-16 NOTE — CONSULT NOTE ADULT - SUBJECTIVE AND OBJECTIVE BOX
CHIEF COMPLAINT: Patient is a 89y old  Male who presents with a chief complaint of     HPI:  89 y/o male with PMHx Anxiety, Atrial fibrillation, CAD, High cholesterol, HTN BIBA from [facility] due to right leg pain. Pt reports fall 4 days ago. Pt reports he is unable to ambulate due to pain. Pt also reports new knee pain, no recent injury. Pt notes he uses a walker at home but unable to bear weight. pt describes pain as aching, non-radiating, and currently 3/10 at rest. pt denies recent fall, fever, chills, cp, SOB, numbness/weakness, cough, HA, or any other complaints. (15 May 2020 22:28). 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, dual chamber PPM  HTN (hypertension)  Atrial fibrillation  CAD (coronary artery disease)  High cholesterol  Anxiety  No significant past surgical history      SOCIAL HISTORY:    FAMILY HISTORY: FAMILY HISTORY:  No pertinent family history in first degree relatives      MEDICATIONS  (STANDING):  aspirin  chewable 81 milliGRAM(s) Oral daily  atorvastatin 40 milliGRAM(s) Oral at bedtime  finasteride 5 milliGRAM(s) Oral daily  furosemide    Tablet 40 milliGRAM(s) Oral daily  lisinopril 5 milliGRAM(s) Oral daily  metoprolol succinate ER 25 milliGRAM(s) Oral daily    MEDICATIONS  (PRN):  acetaminophen   Tablet .. 650 milliGRAM(s) Oral every 6 hours PRN Temp greater or equal to 38C (100.4F), Mild Pain (1 - 3)  ALPRAZolam 0.25 milliGRAM(s) Oral daily PRN anxiety  morphine  - Injectable 2 milliGRAM(s) IV Push every 6 hours PRN Severe Pain (7 - 10)  oxycodone    5 mG/acetaminophen 325 mG 1 Tablet(s) Oral every 6 hours PRN Moderate Pain (4 - 6)  zolpidem 5 milliGRAM(s) Oral at bedtime PRN Insomnia      Allergies    IV Contrast dye (Nausea)  No Known Drug Allergies    Intolerances        REVIEW OF SYSTEMS:    CONSTITUTIONAL: No weakness, fevers or chills    RESPIRATORY: No cough, wheezing, hemoptysis; No shortness of breath  CARDIOVASCULAR: No chest pain or palpitations  GASTROINTESTINAL: No abdominal pain. No nausea, vomiting, or hematemesis; No diarrhea or constipation. No melena or hematochezia.  GENITOURINARY: No dysuria, frequency or hematuria  NEUROLOGICAL: No numbness or weakness  SKIN: No itching or rash  All other review of systems is negative unless indicated above    VITAL SIGNS:   Vital Signs Last 24 Hrs  T(C): 36.5 (16 May 2020 04:42), Max: 36.7 (15 May 2020 20:35)  T(F): 97.7 (16 May 2020 04:42), Max: 98 (15 May 2020 20:35)  HR: 76 (16 May 2020 04:42) (71 - 89)  BP: 135/78 (16 May 2020 04:42) (117/77 - 135/78)  BP(mean): --  RR: 18 (16 May 2020 04:42) (18 - 18)  SpO2: 98% (16 May 2020 04:42) (96% - 98%)    I&O's Summary    16 May 2020 07:01  -  16 May 2020 08:25  --------------------------------------------------------  IN: 0 mL / OUT: 400 mL / NET: -400 mL        PHYSICAL EXAM:    Constitutional: NAD, awake and alert, well-developed      Pulmonary: Non-labored, breath sounds are clear bilaterally, No wheezing, rales or rhonchi  Cardiovascular: PMI not palpable non-displaced Regular S1 and S2, 3/6 DARBY RUSB  Gastrointestinal: Bowel Sounds present, soft, nontender.   Extremities: No lower extremity clubbing ,cyanosis or edema  Neurological: Alert, no focal deficits  Skin: No rashes.  No cyanosis.  Psych:  Mood & affect appropriate     LABS: All Labs Reviewed:                        9.6    6.38  )-----------( 191      ( 16 May 2020 07:36 )             30.8                         11.2   8.27  )-----------( 197      ( 15 May 2020 21:39 )             35.4     16 May 2020 07:36    142    |  102    |  28     ----------------------------<  86     3.5     |  32     |  0.92   15 May 2020 21:39    140    |  99     |  34     ----------------------------<  99     4.0     |  33     |  1.30     Ca    8.7        16 May 2020 07:36  Ca    9.2        15 May 2020 21:39    TPro  6.1    /  Alb  2.7    /  TBili  1.0    /  DBili  x      /  AST  24     /  ALT  15     /  AlkPhos  66     16 May 2020 07:36    PT/INR - ( 15 May 2020 21:40 )   PT: 12.8 sec;   INR: 1.14 ratio         PTT - ( 15 May 2020 21:40 )  PTT:31.2 sec      Blood Culture:       EKG: NSR 78bpm with A sensing V pacing

## 2020-05-16 NOTE — CHART NOTE - NSCHARTNOTEFT_GEN_A_CORE
Called by RN, patient appeared anxious and hypoxic to the 80s on RA. Patient seen and examined at bedside, currently place don 2L NC. Patient stated that he is feeling much better, stated that he has hx of AS and has occasional SOB at baseline. He stated that he is feeling very anxious earlier but felt better after NC. Patient currently denies any chest pain, palpitations, sob, finch, or calf pains.   Currently saturating 94-95% on 2L NC, speaking in full sentences, appear comfortable     Gen: No acute distress, lying in bed comfortably   CV: RRR, +murmur   Resp: CTA b/l, no tachypnea, no accessory muscle use, speaking full sentences  GI: BSx4, soft, nontender, non distended  Ext: R leg pain to movement and palpation (R trochanter fx), no calf tenderness or erythema b/l   Neuro: AAOx3, speaking coherently and answering all questions appropriately     A/p:  - Discussed with ortho, plan on bone scan on Monday, likely surgery earliest on Monday   - Will continue on 2L NC at this time and will titrate to Room air as tolerated  - Start DVT ppx with Subq Heparin -- hold dose Sunday evening prior to planned surgical procedure on Monday   - will continue to monitor, RN to call if any changes Called by RN, patient appeared anxious and hypoxic to the 80s on RA. Patient seen and examined at bedside, currently place don 2L NC. Patient stated that he is feeling much better, stated that he has hx of AS and has occasional SOB at baseline. He stated that he is feeling very anxious earlier but felt better after NC. Patient currently denies any chest pain, palpitations, sob, finch, or calf pains.   Currently saturating 94-95% on 2L NC, speaking in full sentences, appear comfortable. All other VSS    Gen: No acute distress, lying in bed comfortably   CV: RRR, +murmur   Resp: CTA b/l, no tachypnea, no accessory muscle use, speaking full sentences  GI: BSx4, soft, nontender, non distended  Ext: R leg pain to movement and palpation (R trochanter fx), no calf tenderness or erythema b/l   Neuro: AAOx3, speaking coherently and answering all questions appropriately     A/p:  - Discussed with ortho, plan on bone scan on Monday, likely surgery earliest on Monday   - Will continue on 2L NC at this time and will titrate to Room air as tolerated  - Start DVT ppx with Subq Heparin -- hold dose Sunday evening prior to planned surgical procedure on Monday   - will continue to monitor, RN to call if any changes

## 2020-05-16 NOTE — PROGRESS NOTE ADULT - SUBJECTIVE AND OBJECTIVE BOX
Patient is a 89y old  Male who presents with a chief complaint of     INTERVAL HPI/OVERNIGHT EVENTS:  T(C): 36.3 (05-16-20 @ 13:31), Max: 36.7 (05-15-20 @ 20:35)  HR: 65 (05-16-20 @ 15:44) (64 - 89)  BP: 104/69 (05-16-20 @ 13:31) (104/69 - 135/78)  RR: 18 (05-16-20 @ 15:44) (16 - 18)  SpO2: 94% (05-16-20 @ 15:44) (90% - 98%)  Wt(kg): --  I&O's Summary    16 May 2020 07:01  -  16 May 2020 18:03  --------------------------------------------------------  IN: 0 mL / OUT: 1325 mL / NET: -1325 mL        LABS:                        9.6    6.38  )-----------( 191      ( 16 May 2020 07:36 )             30.8     05-16    142  |  102  |  28<H>  ----------------------------<  86  3.5   |  32<H>  |  0.92    Ca    8.7      16 May 2020 07:36    TPro  6.1  /  Alb  2.7<L>  /  TBili  1.0  /  DBili  x   /  AST  24  /  ALT  15  /  AlkPhos  66  05-16    PT/INR - ( 15 May 2020 21:40 )   PT: 12.8 sec;   INR: 1.14 ratio         PTT - ( 15 May 2020 21:40 )  PTT:31.2 sec    CAPILLARY BLOOD GLUCOSE                MEDICATIONS  (STANDING):  aspirin  chewable 81 milliGRAM(s) Oral daily  atorvastatin 40 milliGRAM(s) Oral at bedtime  finasteride 5 milliGRAM(s) Oral daily  furosemide    Tablet 40 milliGRAM(s) Oral daily  heparin   Injectable 5000 Unit(s) SubCutaneous every 8 hours  lisinopril 5 milliGRAM(s) Oral daily  metoprolol succinate ER 25 milliGRAM(s) Oral daily    MEDICATIONS  (PRN):  acetaminophen   Tablet .. 650 milliGRAM(s) Oral every 6 hours PRN Temp greater or equal to 38C (100.4F), Mild Pain (1 - 3)  ALPRAZolam 0.25 milliGRAM(s) Oral daily PRN anxiety  morphine  - Injectable 2 milliGRAM(s) IV Push every 6 hours PRN Severe Pain (7 - 10)  oxycodone    5 mG/acetaminophen 325 mG 1 Tablet(s) Oral every 6 hours PRN Moderate Pain (4 - 6)  zolpidem 5 milliGRAM(s) Oral at bedtime PRN Insomnia          PHYSICAL EXAM:  GENERAL: NAD, well-groomed, well-developed  HEAD:  Atraumatic, Normocephalic  CHEST/LUNG: Clear to percussion bilaterally; No rales, rhonchi, wheezing, or rubs  HEART: Regular rate and rhythm; No murmurs, rubs, or gallops  ABDOMEN: Soft, Nontender, Nondistended; Bowel sounds present  EXTREMITIES:  2+ Peripheral Pulses, No clubbing, cyanosis, or edema  LYMPH: No lymphadenopathy noted  SKIN: No rashes or lesions    Care Discussed with Consultants/Other Providers [ ] YES  [ ] NO

## 2020-05-16 NOTE — PROGRESS NOTE ADULT - SUBJECTIVE AND OBJECTIVE BOX
Pt seen and examined at bedside, resting comfortably. No acute events overnight. Denies numbness/tingling, chest pain, SOB.    Vital Signs Last 24 Hrs  T(C): 36.5 (16 May 2020 04:42), Max: 36.7 (15 May 2020 20:35)  T(F): 97.7 (16 May 2020 04:42), Max: 98 (15 May 2020 20:35)  HR: 76 (16 May 2020 04:42) (71 - 89)  BP: 135/78 (16 May 2020 04:42) (117/77 - 135/78)  BP(mean): --  RR: 18 (16 May 2020 04:42) (18 - 18)  SpO2: 98% (16 May 2020 04:42) (96% - 98%)    Physical Exam:  Gen: NAD, AAOx3    RLE:   Skin intact  No edema, erythema, ecchymosis  No gross deformity  No bony TTP of Knee/Foot/Toes  Unable to SLR  Mildly tender with logroll  +EHL/FHL/TA/GS  SILT L2-S1  +DP/PT Pulses  Compartments soft and compressible  No calf TTP B/L    Pt is a 89y Male with a R greater trochanter fracture    -Concern for extension into lesser trochanter requiring surgery; Need further imaging for evaluation  -MRI R hip ordered but patient has PPM that is not MRI compatible; Bone scan ordered, will be completed Monday 5/18  -Closed fracture, NV intact  -Pain control  -NWB affected extremity  -Bedrest - strict  -SCDs  -Medical management per primary team  -Will discuss with Dr. Dickey and advise if plan changes    Desmond Grimaldo D.O.  PGY-1 Orthopaedic Surgery

## 2020-05-17 DIAGNOSIS — N40.1 BENIGN PROSTATIC HYPERPLASIA WITH LOWER URINARY TRACT SYMPTOMS: ICD-10-CM

## 2020-05-17 LAB
ALBUMIN SERPL ELPH-MCNC: 2.8 G/DL — LOW (ref 3.3–5)
ALP SERPL-CCNC: 76 U/L — SIGNIFICANT CHANGE UP (ref 40–120)
ALT FLD-CCNC: 16 U/L — SIGNIFICANT CHANGE UP (ref 12–78)
ANION GAP SERPL CALC-SCNC: 6 MMOL/L — SIGNIFICANT CHANGE UP (ref 5–17)
AST SERPL-CCNC: 23 U/L — SIGNIFICANT CHANGE UP (ref 15–37)
BILIRUB SERPL-MCNC: 1 MG/DL — SIGNIFICANT CHANGE UP (ref 0.2–1.2)
BUN SERPL-MCNC: 22 MG/DL — SIGNIFICANT CHANGE UP (ref 7–23)
CALCIUM SERPL-MCNC: 8.9 MG/DL — SIGNIFICANT CHANGE UP (ref 8.5–10.1)
CHLORIDE SERPL-SCNC: 102 MMOL/L — SIGNIFICANT CHANGE UP (ref 96–108)
CO2 SERPL-SCNC: 34 MMOL/L — HIGH (ref 22–31)
CREAT SERPL-MCNC: 0.96 MG/DL — SIGNIFICANT CHANGE UP (ref 0.5–1.3)
GLUCOSE SERPL-MCNC: 91 MG/DL — SIGNIFICANT CHANGE UP (ref 70–99)
HCT VFR BLD CALC: 34.5 % — LOW (ref 39–50)
HGB BLD-MCNC: 10.6 G/DL — LOW (ref 13–17)
MCHC RBC-ENTMCNC: 28.7 PG — SIGNIFICANT CHANGE UP (ref 27–34)
MCHC RBC-ENTMCNC: 30.7 GM/DL — LOW (ref 32–36)
MCV RBC AUTO: 93.5 FL — SIGNIFICANT CHANGE UP (ref 80–100)
NRBC # BLD: 0 /100 WBCS — SIGNIFICANT CHANGE UP (ref 0–0)
PLATELET # BLD AUTO: 199 K/UL — SIGNIFICANT CHANGE UP (ref 150–400)
POTASSIUM SERPL-MCNC: 4.2 MMOL/L — SIGNIFICANT CHANGE UP (ref 3.5–5.3)
POTASSIUM SERPL-SCNC: 4.2 MMOL/L — SIGNIFICANT CHANGE UP (ref 3.5–5.3)
PROT SERPL-MCNC: 6.7 G/DL — SIGNIFICANT CHANGE UP (ref 6–8.3)
RBC # BLD: 3.69 M/UL — LOW (ref 4.2–5.8)
RBC # FLD: 15.4 % — HIGH (ref 10.3–14.5)
SODIUM SERPL-SCNC: 142 MMOL/L — SIGNIFICANT CHANGE UP (ref 135–145)
WBC # BLD: 7.83 K/UL — SIGNIFICANT CHANGE UP (ref 3.8–10.5)
WBC # FLD AUTO: 7.83 K/UL — SIGNIFICANT CHANGE UP (ref 3.8–10.5)

## 2020-05-17 PROCEDURE — 51702 INSERT TEMP BLADDER CATH: CPT

## 2020-05-17 RX ADMIN — LISINOPRIL 5 MILLIGRAM(S): 2.5 TABLET ORAL at 05:29

## 2020-05-17 RX ADMIN — Medication 81 MILLIGRAM(S): at 13:34

## 2020-05-17 RX ADMIN — Medication 25 MILLIGRAM(S): at 05:29

## 2020-05-17 RX ADMIN — HEPARIN SODIUM 5000 UNIT(S): 5000 INJECTION INTRAVENOUS; SUBCUTANEOUS at 05:29

## 2020-05-17 RX ADMIN — ATORVASTATIN CALCIUM 40 MILLIGRAM(S): 80 TABLET, FILM COATED ORAL at 21:35

## 2020-05-17 RX ADMIN — HEPARIN SODIUM 5000 UNIT(S): 5000 INJECTION INTRAVENOUS; SUBCUTANEOUS at 21:35

## 2020-05-17 RX ADMIN — FINASTERIDE 5 MILLIGRAM(S): 5 TABLET, FILM COATED ORAL at 13:34

## 2020-05-17 RX ADMIN — HEPARIN SODIUM 5000 UNIT(S): 5000 INJECTION INTRAVENOUS; SUBCUTANEOUS at 13:37

## 2020-05-17 RX ADMIN — Medication 40 MILLIGRAM(S): at 05:29

## 2020-05-17 NOTE — PHYSICAL THERAPY INITIAL EVALUATION ADULT - ADDITIONAL COMMENTS
Pt resides at [facility] and stated he ambulated with rolling walker and assist and required assistance for ADLs

## 2020-05-17 NOTE — PROGRESS NOTE ADULT - ASSESSMENT
CAD- asymptomatic  TAVR- asymptomatic, Echo pending  HTN- controlled  A fib- rate controlled not on AC

## 2020-05-17 NOTE — CONSULT NOTE ADULT - SUBJECTIVE AND OBJECTIVE BOX
Date/Time Patient Seen:  		  Referring MD:   Data Reviewed	       Patient is a 89y old  Male who presents with a chief complaint of     Subjective/HPI    in bed  seen and examined  vs and meds reviewed  labs reviewed  H and P reviewed  ER provider note reviewed  awake  verbal  lives in skilled nursing    89 y/o male with PMHx Anxiety, Atrial fibrillation, CAD, High cholesterol, HTN BIBA from [facility] due to right leg pain. Pt reports fall 4 days ago. Pt reports he is unable to ambulate due to pain. Pt also reports new knee pain, no recent injury. Pt notes he uses a walker at home but unable to bear weight. pt describes pain as aching, non-radiating, and currently 3/10 at rest. pt denies recent fall, fever, chills, cp, SOB, numbness/weakness, cough, HA, or any other complaints.    Past Medical, Past Surgical, and Family History:  PAST MEDICAL HISTORY:  Anxiety     Atrial fibrillation     CAD (coronary artery disease)     High cholesterol     HTN (hypertension).     No significant past surgical history.     No pertinent family history in first degree relatives.     No Pertinent Family History in first degree relatives of: fracture.     Social History:  Social History (marital status, living situation, occupation, tobacco use, alcohol and drug use, and sexual history): -ve     Tobacco Screening:  · Core Measure Site	Yes  · Has the patient used tobacco in the past 30 days?	No    Risk Assessment:    Present on Admission:  Deep Venous Thrombosis	no  Pulmonary Embolus	no     Heart Failure:  Does this patient have a history of or has been diagnosed with heart failure? no.     PAST MEDICAL & SURGICAL HISTORY:  HTN (hypertension)  Atrial fibrillation  CAD (coronary artery disease)  High cholesterol  Anxiety  No significant past surgical history        Medication list         MEDICATIONS  (STANDING):  aspirin  chewable 81 milliGRAM(s) Oral daily  atorvastatin 40 milliGRAM(s) Oral at bedtime  finasteride 5 milliGRAM(s) Oral daily  furosemide    Tablet 40 milliGRAM(s) Oral daily  heparin   Injectable 5000 Unit(s) SubCutaneous every 8 hours  lisinopril 5 milliGRAM(s) Oral daily  metoprolol succinate ER 25 milliGRAM(s) Oral daily    MEDICATIONS  (PRN):  acetaminophen   Tablet .. 650 milliGRAM(s) Oral every 6 hours PRN Temp greater or equal to 38C (100.4F), Mild Pain (1 - 3)  ALPRAZolam 0.25 milliGRAM(s) Oral daily PRN anxiety  morphine  - Injectable 2 milliGRAM(s) IV Push every 6 hours PRN Severe Pain (7 - 10)  oxycodone    5 mG/acetaminophen 325 mG 1 Tablet(s) Oral every 6 hours PRN Moderate Pain (4 - 6)  zolpidem 5 milliGRAM(s) Oral at bedtime PRN Insomnia         Vitals log        ICU Vital Signs Last 24 Hrs  T(C): 36.4 (17 May 2020 05:05), Max: 36.6 (16 May 2020 20:18)  T(F): 97.5 (17 May 2020 05:05), Max: 97.9 (16 May 2020 20:18)  HR: 62 (17 May 2020 05:05) (62 - 83)  BP: 117/74 (17 May 2020 05:05) (104/69 - 124/78)  BP(mean): --  ABP: --  ABP(mean): --  RR: 18 (17 May 2020 05:05) (16 - 19)  SpO2: 98% (17 May 2020 05:05) (90% - 98%)           Input and Output:  I&O's Detail    16 May 2020 07:01  -  17 May 2020 06:14  --------------------------------------------------------  IN:  Total IN: 0 mL    OUT:    Intermittent Catheterization - Urethral: 1775 mL  Total OUT: 1775 mL    Total NET: -1775 mL          Lab Data                        9.6    6.38  )-----------( 191      ( 16 May 2020 07:36 )             30.8     05-16    142  |  102  |  28<H>  ----------------------------<  86  3.5   |  32<H>  |  0.92    Ca    8.7      16 May 2020 07:36    TPro  6.1  /  Alb  2.7<L>  /  TBili  1.0  /  DBili  x   /  AST  24  /  ALT  15  /  AlkPhos  66  05-16            Review of Systems	  weak  sob  finch      Objective     Physical Examination    heart s1s2  lung dec BS  abd soft      Pertinent Lab findings & Imaging      Alec:  NO   Adequate UO     I&O's Detail    16 May 2020 07:01  -  17 May 2020 06:14  --------------------------------------------------------  IN:  Total IN: 0 mL    OUT:    Intermittent Catheterization - Urethral: 1775 mL  Total OUT: 1775 mL    Total NET: -1775 mL               Discussed with:     Cultures:	        Radiology      EXAM:  XR CHEST AP OR PA 1V                            PROCEDURE DATE:  05/15/2020          INTERPRETATION:    Examination: XR CHEST    History: ADMDIAG1: S72.111A DISP FX OF GREATER TROCHANTER OF RIGHT FEMUR, INIT/, admission for hip fx  Prior 5/11/2020  Findings:  Status post median sternotomy. Status post TAVR. No change heart mediastinum. Low lung volumes. Grossly clear lungs. No consolidation or effusion. Left ICD reidentified in situ.    Impression:  1.  Grossly clear lungs      DISCRETE X-RAY DATA:  Percent of LEFT lung opacification: Clear  Percent of RIGHT lung opacification: Clear  Change in lung opacification from most recent x-ray (<=3 days): No Prior  Change from prior dated 3 or more days (same admission): No Prior                  SOFI ROSADO M.D., ATTENDING RADIOLOGIST  This document has been electronically signed. May 16 2020  8:26AM                EXAM:  CT PELVIS BONY ONLY                            PROCEDURE DATE:  05/15/2020          INTERPRETATION:  CLINICAL INDICATION: Right hip pain status post fall.    TECHNIQUE: CT axial images of the pelvis were obtained without intravenous contrast. Coronal and sagittal reformatted images were also obtained.     COMPARISON: XR: 5/15/2020 and 5/11/2020. CT: None. MR: None.    FINDINGS:    Bones: Somewhat heterogeneous pattern of decreased bone mineralization, notable in the lumbar spine. Acute, comminuted and mildly posteromedially displaced fracture of the right greater trochanter. No dislocation.     Bilateral hip osteoarthrosis. Degenerative changes of the visualized lower lumbar spine, pubic symphysis and sacroiliac joints. Renal anterolisthesis of L4 on L5 and L5 on S1.    Soft tissue: A 1.4 x 2.3 x 5.6 cm structure measuring simple fluid to slightly greater than simple fluid attenuation with surrounding stranding, which may represent a resolving hematoma/edema. No significant hip joint effusion.     Diffuse muscle bulk loss with fatty replacement. Chondrocalcinosis at the pubic symphysis. Postsurgical changes along the lower anterior abdominal/pelvic wall soft tissue. Fat-containing left > right inguinal hernia.    Additional: Colon diverticulosis. Partially visualized left renal cyst. Postsurgical changes in the prostate. Atherosclerosis. Ectatic infrarenal aorta (2.4 x 2.5 standard). 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 Infrarenal IVC filter.     IMPRESSION:    Acute, comminuted and mildly posteromedially displaced fracture of the right greater trochanter. Follow-up MRI may be obtained for further evaluation of fracture extension and additional potential fracture.    Heterogenous pattern of decreased bone mineralization, which may be due to osteopenia although marrow infiltrative/replacing process (i.e. neoplasms/metastasis) cannot be excluded. Recommend clinical correlation and follow-up.    Additional findings as described.                MACARENA QUILES M.D., ATTENDING RADIOLOGIST  This document has been electronically signed. May 16 2020 12:18AM

## 2020-05-17 NOTE — PROGRESS NOTE ADULT - ASSESSMENT
89 y/o male with PMHx Anxiety, Atrial fibrillation, CAD, High cholesterol, HTN BIBA from [facility] due to right leg pain. Pt reports fall 4 days ago. Pt reports he is unable to ambulate due to pain. Pt also reports new knee pain, no recent injury. Pt notes he uses a walker at home but unable to bear weight. pt describes pain as aching, non-radiating, and currently 3/10 at rest. pt denies recent fall, fever, chills, cp, SOB, numbness/weakness, cough, HA, or any other complaints.     Problem/Plan - 1:  ·  Problem: Closed displaced fracture of greater trochanter of right femur, initial encounter.  Plan: OR as per ortho   pain control  OR once cleared by cards   will monitor.     Problem/Plan - 2:  ·  Problem: Atrial fibrillation.  Plan: telemonitor cards fu       Problem/Plan - 3:  ·  Problem: CAD (coronary artery disease).  Plan: telemonitor    home medscardiology fu for clearance for procedure     medical cleared for OR , pending cardiac clearance, echo pending

## 2020-05-17 NOTE — PROCEDURE NOTE - NSURITECHNIQUE_GU_A_CORE
Proper hand hygiene was performed/The catheter was appropriately lubricated/The collection bag is below the level of the patient and urinary bladder/Sterile gloves were worn for the duration of the procedure/A sterile drape was used to cover all adjacent areas/The catheter was secured with a securement device (e.g. StatLock)/The urinary drainage system is closed at the end of the procedure/All applicable medical record documentation is completed

## 2020-05-17 NOTE — CONSULT NOTE ADULT - PROBLEM SELECTOR RECOMMENDATION 9
right hip fx - OP - OA - Fall  [facility] rx regimen  ortho eval - poss OR -   pain assessment  CXR clear   TTE pending - cardiac optimization -   dvt p at present  on o2 support - I hanh -   labs and imaging reviewed -   discussed GOC with the patient -

## 2020-05-17 NOTE — PHYSICAL THERAPY INITIAL EVALUATION ADULT - PERTINENT HX OF CURRENT PROBLEM, REHAB EVAL
89 y/o male adm 5/15 from long term with displaced fx of R greater trochanter, PWB RLE. COVID 19 negative. CT head negative

## 2020-05-17 NOTE — PROGRESS NOTE ADULT - SUBJECTIVE AND OBJECTIVE BOX
Patient is a 89y old  Male who presents with a chief complaint of     INTERVAL HPI/OVERNIGHT EVENTS:  T(C): 36.5 (05-17-20 @ 13:40), Max: 36.6 (05-16-20 @ 20:18)  HR: 64 (05-17-20 @ 13:40) (62 - 83)  BP: 97/64 (05-17-20 @ 13:40) (97/64 - 124/78)  RR: 17 (05-17-20 @ 13:40) (17 - 19)  SpO2: 98% (05-17-20 @ 13:40) (98% - 98%)  Wt(kg): --  I&O's Summary    16 May 2020 07:01  -  17 May 2020 07:00  --------------------------------------------------------  IN: 0 mL / OUT: 2375 mL / NET: -2375 mL        LABS:                        10.6   7.83  )-----------( 199      ( 17 May 2020 07:49 )             34.5     05-17    142  |  102  |  22  ----------------------------<  91  4.2   |  34<H>  |  0.96    Ca    8.9      17 May 2020 07:49    TPro  6.7  /  Alb  2.8<L>  /  TBili  1.0  /  DBili  x   /  AST  23  /  ALT  16  /  AlkPhos  76  05-17    PT/INR - ( 15 May 2020 21:40 )   PT: 12.8 sec;   INR: 1.14 ratio         PTT - ( 15 May 2020 21:40 )  PTT:31.2 sec    CAPILLARY BLOOD GLUCOSE                MEDICATIONS  (STANDING):  aspirin  chewable 81 milliGRAM(s) Oral daily  atorvastatin 40 milliGRAM(s) Oral at bedtime  finasteride 5 milliGRAM(s) Oral daily  furosemide    Tablet 40 milliGRAM(s) Oral daily  heparin   Injectable 5000 Unit(s) SubCutaneous every 8 hours  lisinopril 5 milliGRAM(s) Oral daily  metoprolol succinate ER 25 milliGRAM(s) Oral daily    MEDICATIONS  (PRN):  acetaminophen   Tablet .. 650 milliGRAM(s) Oral every 6 hours PRN Temp greater or equal to 38C (100.4F), Mild Pain (1 - 3)  ALPRAZolam 0.25 milliGRAM(s) Oral daily PRN anxiety  morphine  - Injectable 2 milliGRAM(s) IV Push every 6 hours PRN Severe Pain (7 - 10)  oxycodone    5 mG/acetaminophen 325 mG 1 Tablet(s) Oral every 6 hours PRN Moderate Pain (4 - 6)  zolpidem 5 milliGRAM(s) Oral at bedtime PRN Insomnia          PHYSICAL EXAM:  GENERAL: frail  CHEST/LUNG: Clear to percussion bilaterally; No rales, rhonchi, wheezing, or rubs  HEART: Regular rate and rhythm; No murmurs, rubs, or gallops  ABDOMEN: Soft, Nontender, Nondistended; Bowel sounds present  EXTREMITIES:  no edema     Care Discussed with Consultants/Other Providers [ ] YES  [ ] NO

## 2020-05-17 NOTE — CONSULT NOTE ADULT - SUBJECTIVE AND OBJECTIVE BOX
CHIEF COMPLAINT: Unable to pass catheter    HISTORY OF PRESENT ILLNESS:    89 year old male on self catheterization admitted with hip fracture. Patient developed difficulty with self catheterization last night. 18F Michael was placed by the surgical PA.  Michael has been draining well since that time.    PAST MEDICAL & SURGICAL HISTORY:  HTN (hypertension)  Atrial fibrillation  CAD (coronary artery disease)  High cholesterol  Anxiety  No significant past surgical history      REVIEW OF SYSTEMS:    CONSTITUTIONAL: No weakness, fevers or chills  EYES/ENT: No visual changes;  No vertigo or throat pain   NECK: No pain or stiffness  RESPIRATORY: No cough, wheezing, hemoptysis; No shortness of breath  CARDIOVASCULAR: No chest pain or palpitations  GASTROINTESTINAL: No abdominal or epigastric pain. No nausea, vomiting, or hematemesis; No diarrhea or constipation. No melena or hematochezia.  GENITOURINARY: as above  NEUROLOGICAL: No numbness or weakness  SKIN: No itching, burning, rashes, or lesions   All other review of systems is negative unless indicated above.    MEDICATIONS  (STANDING):  aspirin  chewable 81 milliGRAM(s) Oral daily  atorvastatin 40 milliGRAM(s) Oral at bedtime  finasteride 5 milliGRAM(s) Oral daily  furosemide    Tablet 40 milliGRAM(s) Oral daily  heparin   Injectable 5000 Unit(s) SubCutaneous every 8 hours  lisinopril 5 milliGRAM(s) Oral daily  metoprolol succinate ER 25 milliGRAM(s) Oral daily    MEDICATIONS  (PRN):  acetaminophen   Tablet .. 650 milliGRAM(s) Oral every 6 hours PRN Temp greater or equal to 38C (100.4F), Mild Pain (1 - 3)  ALPRAZolam 0.25 milliGRAM(s) Oral daily PRN anxiety  morphine  - Injectable 2 milliGRAM(s) IV Push every 6 hours PRN Severe Pain (7 - 10)  oxycodone    5 mG/acetaminophen 325 mG 1 Tablet(s) Oral every 6 hours PRN Moderate Pain (4 - 6)  zolpidem 5 milliGRAM(s) Oral at bedtime PRN Insomnia      Allergies    IV Contrast dye (Nausea)  No Known Drug Allergies    Intolerances        SOCIAL HISTORY:    FAMILY HISTORY:  No pertinent family history in first degree relatives      Vital Signs Last 24 Hrs  T(C): 36.4 (17 May 2020 05:05), Max: 36.6 (16 May 2020 20:18)  T(F): 97.5 (17 May 2020 05:05), Max: 97.9 (16 May 2020 20:18)  HR: 62 (17 May 2020 05:05) (62 - 83)  BP: 117/74 (17 May 2020 05:05) (104/69 - 124/78)  BP(mean): --  RR: 18 (17 May 2020 05:05) (16 - 19)  SpO2: 98% (17 May 2020 05:05) (90% - 98%)    PHYSICAL EXAM:    Constitutional: NAD, well-developed  HEENT: ANNETTE, EOMI, Normal Hearing, MMM  Neck: No LAD, No JVD  Back: Normal spine flexure, No CVA tenderness  Respiratory: CTAB   Cardiovascular: S1 and S2, RRR, no M/G/R  Abd: BS+, soft, NT/ND, No CVAT  : Normal phallus,open meatus,bilateral descended testes, no masses  LISANDRO: Normal prostate, no masses  Extremities: No peripheral edema  Vascular: 2+ peripheral pulses  Neurological: A/O x 3, no focal deficits  Psychiatric: Normal mood, normal affect  Musculoskeletal: 5/5 strength b/l upper and lower extremities  Skin: No rashes    LABS:                        10.6   7.83  )-----------( 199      ( 17 May 2020 07:49 )             34.5     05-17    142  |  102  |  22  ----------------------------<  91  4.2   |  34<H>  |  0.96    Ca    8.9      17 May 2020 07:49    TPro  6.7  /  Alb  2.8<L>  /  TBili  1.0  /  DBili  x   /  AST  23  /  ALT  16  /  AlkPhos  76  05-17    PT/INR - ( 15 May 2020 21:40 )   PT: 12.8 sec;   INR: 1.14 ratio         PTT - ( 15 May 2020 21:40 )  PTT:31.2 sec CHIEF COMPLAINT: Unable to pass catheter    HISTORY OF PRESENT ILLNESS:    89 year old male on self catheterization admitted with hip fracture. Patient developed difficulty with self catheterization last night. 18F Michael was placed by the surgical PA.  Michael has been draining well since that time. He has been performing self catheterization for 7 years s/p TURP x 2 in the past. He has used a coude catheter at home without difficulty. He is cared for by Dr. Mccartney in [place].    PAST MEDICAL & SURGICAL HISTORY:  HTN (hypertension)  Atrial fibrillation  CAD (coronary artery disease)  High cholesterol  Anxiety  No significant past surgical history      REVIEW OF SYSTEMS:    CONSTITUTIONAL: No weakness, fevers or chills  EYES/ENT: No visual changes;  No vertigo or throat pain   NECK: No pain or stiffness  RESPIRATORY: No cough, wheezing, hemoptysis; No shortness of breath  CARDIOVASCULAR: No chest pain or palpitations  GASTROINTESTINAL: No abdominal or epigastric pain. No nausea, vomiting, or hematemesis; No diarrhea or constipation. No melena or hematochezia.  GENITOURINARY: as above  NEUROLOGICAL: No numbness or weakness  SKIN: No itching, burning, rashes, or lesions   All other review of systems is negative unless indicated above.    MEDICATIONS  (STANDING):  aspirin  chewable 81 milliGRAM(s) Oral daily  atorvastatin 40 milliGRAM(s) Oral at bedtime  finasteride 5 milliGRAM(s) Oral daily  furosemide    Tablet 40 milliGRAM(s) Oral daily  heparin   Injectable 5000 Unit(s) SubCutaneous every 8 hours  lisinopril 5 milliGRAM(s) Oral daily  metoprolol succinate ER 25 milliGRAM(s) Oral daily    MEDICATIONS  (PRN):  acetaminophen   Tablet .. 650 milliGRAM(s) Oral every 6 hours PRN Temp greater or equal to 38C (100.4F), Mild Pain (1 - 3)  ALPRAZolam 0.25 milliGRAM(s) Oral daily PRN anxiety  morphine  - Injectable 2 milliGRAM(s) IV Push every 6 hours PRN Severe Pain (7 - 10)  oxycodone    5 mG/acetaminophen 325 mG 1 Tablet(s) Oral every 6 hours PRN Moderate Pain (4 - 6)  zolpidem 5 milliGRAM(s) Oral at bedtime PRN Insomnia      Allergies    IV Contrast dye (Nausea)  No Known Drug Allergies    Intolerances        SOCIAL HISTORY:    FAMILY HISTORY:  No pertinent family history in first degree relatives      Vital Signs Last 24 Hrs  T(C): 36.4 (17 May 2020 05:05), Max: 36.6 (16 May 2020 20:18)  T(F): 97.5 (17 May 2020 05:05), Max: 97.9 (16 May 2020 20:18)  HR: 62 (17 May 2020 05:05) (62 - 83)  BP: 117/74 (17 May 2020 05:05) (104/69 - 124/78)  BP(mean): --  RR: 18 (17 May 2020 05:05) (16 - 19)  SpO2: 98% (17 May 2020 05:05) (90% - 98%)    PHYSICAL EXAM:    Constitutional: NAD, well-developed  HEENT: ANNETTE, EOMI, Normal Hearing, MMM  Neck: No LAD, No JVD  Back: Normal spine flexure, No CVA tenderness  Respiratory: CTAB   Cardiovascular: S1 and S2, RRR, no M/G/R  Abd: BS+, soft, NT/ND, No CVAT  : Normal phallus,open meatus,bilateral descended testes, no masses  LISANDRO: Normal prostate, no masses  Extremities: No peripheral edema  Vascular: 2+ peripheral pulses  Neurological: A/O x 3, no focal deficits  Psychiatric: Normal mood, normal affect  Musculoskeletal: 5/5 strength b/l upper and lower extremities  Skin: No rashes    LABS:                        10.6   7.83  )-----------( 199      ( 17 May 2020 07:49 )             34.5     05-17    142  |  102  |  22  ----------------------------<  91  4.2   |  34<H>  |  0.96    Ca    8.9      17 May 2020 07:49    TPro  6.7  /  Alb  2.8<L>  /  TBili  1.0  /  DBili  x   /  AST  23  /  ALT  16  /  AlkPhos  76  05-17    PT/INR - ( 15 May 2020 21:40 )   PT: 12.8 sec;   INR: 1.14 ratio         PTT - ( 15 May 2020 21:40 )  PTT:31.2 sec

## 2020-05-17 NOTE — PROGRESS NOTE ADULT - SUBJECTIVE AND OBJECTIVE BOX
CHIEF COMPLAINT: Patient is a 89y old  Male who presents with a chief complaint of     Follow Up: [ ] Chest Pain      [ ] Dyspnea     [ ] Palpitations    [ ] Atrial Fibrillation     [ ] Ventricular Dysrhythmia    [ ] Abnormal EKG                      [ ] Abnormal Cardiac Enzymes     [x ] Valvular Disease   [ x] CAD  [ ] Hypertension [ ] CHF      HPI:  89 y/o male with PMHx Anxiety, Atrial fibrillation, CAD, High cholesterol, HTN BIBA from [facility] due to right leg pain. Pt reports fall 4 days ago. Pt reports he is unable to ambulate due to pain. Pt also reports new knee pain, no recent injury. Pt notes he uses a walker at home but unable to bear weight. pt describes pain as aching, non-radiating, and currently 3/10 at rest. pt denies recent fall, fever, chills, cp, SOB, numbness/weakness, cough, HA, or any other complaints. (15 May 2020 22:28)      PAST MEDICAL & SURGICAL HISTORY:  HTN (hypertension)  Atrial fibrillation  CAD (coronary artery disease)  High cholesterol  Anxiety  No significant past surgical history      MEDICATIONS  (STANDING):  aspirin  chewable 81 milliGRAM(s) Oral daily  atorvastatin 40 milliGRAM(s) Oral at bedtime  finasteride 5 milliGRAM(s) Oral daily  furosemide    Tablet 40 milliGRAM(s) Oral daily  heparin   Injectable 5000 Unit(s) SubCutaneous every 8 hours  lisinopril 5 milliGRAM(s) Oral daily  metoprolol succinate ER 25 milliGRAM(s) Oral daily    MEDICATIONS  (PRN):  acetaminophen   Tablet .. 650 milliGRAM(s) Oral every 6 hours PRN Temp greater or equal to 38C (100.4F), Mild Pain (1 - 3)  ALPRAZolam 0.25 milliGRAM(s) Oral daily PRN anxiety  morphine  - Injectable 2 milliGRAM(s) IV Push every 6 hours PRN Severe Pain (7 - 10)  oxycodone    5 mG/acetaminophen 325 mG 1 Tablet(s) Oral every 6 hours PRN Moderate Pain (4 - 6)  zolpidem 5 milliGRAM(s) Oral at bedtime PRN Insomnia      Allergies    IV Contrast dye (Nausea)  No Known Drug Allergies    Intolerances        REVIEW OF SYSTEMS:    CONSTITUTIONAL: No weakness, fevers or chills.     NECK: No pain or stiffness  RESPIRATORY: No cough, wheezing, hemoptysis; No shortness of breath  CARDIOVASCULAR: No chest pain or palpitations  GASTROINTESTINAL: No abdominal or epigastric pain. No nausea, vomiting  NEUROLOGICAL: No numbness or weakness  SKIN: No itching, burning, rashes, or lesions   All other review of systems is negative unless indicated above    Vital Signs Last 24 Hrs  T(C): 36.4 (17 May 2020 05:05), Max: 36.6 (16 May 2020 20:18)  T(F): 97.5 (17 May 2020 05:05), Max: 97.9 (16 May 2020 20:18)  HR: 62 (17 May 2020 05:05) (62 - 83)  BP: 117/74 (17 May 2020 05:05) (104/69 - 124/78)  BP(mean): --  RR: 18 (17 May 2020 05:05) (16 - 19)  SpO2: 98% (17 May 2020 05:05) (90% - 98%)    I&O's Summary    16 May 2020 07:01  -  17 May 2020 07:00  --------------------------------------------------------  IN: 0 mL / OUT: 2375 mL / NET: -2375 mL        PHYSICAL EXAM:    Constitutional: NAD, awake and alert, well-developed  Pulmonary: Non-labored, breath sounds are clear bilaterally, No wheezing, rales or rhonchi  Cardiovascular: Regular, S1 and S2, 3/6 DARBY RUSB  Gastrointestinal: Bowel Sounds present, soft, nontender.   Extremities: No lower extremity clubbing cyanosis or edema  Neurological: Alert, no focal deficits  Skin: No rashes.  Psych:  Mood & affect appropriate    LABS: All Labs Reviewed:                        10.6   7.83  )-----------( 199      ( 17 May 2020 07:49 )             34.5                         9.6    6.38  )-----------( 191      ( 16 May 2020 07:36 )             30.8                         11.2   8.27  )-----------( 197      ( 15 May 2020 21:39 )             35.4     17 May 2020 07:49    142    |  102    |  22     ----------------------------<  91     4.2     |  34     |  0.96   16 May 2020 07:36    142    |  102    |  28     ----------------------------<  86     3.5     |  32     |  0.92   15 May 2020 21:39    140    |  99     |  34     ----------------------------<  99     4.0     |  33     |  1.30     Ca    8.9        17 May 2020 07:49  Ca    8.7        16 May 2020 07:36  Ca    9.2        15 May 2020 21:39    TPro  6.7    /  Alb  2.8    /  TBili  1.0    /  DBili  x      /  AST  23     /  ALT  16     /  AlkPhos  76     17 May 2020 07:49  TPro  6.1    /  Alb  2.7    /  TBili  1.0    /  DBili  x      /  AST  24     /  ALT  15     /  AlkPhos  66     16 May 2020 07:36    PT/INR - ( 15 May 2020 21:40 )   PT: 12.8 sec;   INR: 1.14 ratio         PTT - ( 15 May 2020 21:40 )  PTT:31.2 sec

## 2020-05-17 NOTE — CONSULT NOTE ADULT - PROBLEM SELECTOR RECOMMENDATION 9
s/p difficulty with self catheterization. Patient to undergo ORIF of right hip tomorrow. Michael should be maintained until patient is able perform self catheterization with his catheters from home. He ultimately should be transferred to rehab with indwelling Michael catheter.

## 2020-05-18 LAB
ALBUMIN SERPL ELPH-MCNC: 2.7 G/DL — LOW (ref 3.3–5)
ALP SERPL-CCNC: 75 U/L — SIGNIFICANT CHANGE UP (ref 40–120)
ALT FLD-CCNC: 14 U/L — SIGNIFICANT CHANGE UP (ref 12–78)
ANION GAP SERPL CALC-SCNC: 4 MMOL/L — LOW (ref 5–17)
AST SERPL-CCNC: 20 U/L — SIGNIFICANT CHANGE UP (ref 15–37)
BILIRUB SERPL-MCNC: 1 MG/DL — SIGNIFICANT CHANGE UP (ref 0.2–1.2)
BUN SERPL-MCNC: 22 MG/DL — SIGNIFICANT CHANGE UP (ref 7–23)
CALCIUM SERPL-MCNC: 9.2 MG/DL — SIGNIFICANT CHANGE UP (ref 8.5–10.1)
CHLORIDE SERPL-SCNC: 99 MMOL/L — SIGNIFICANT CHANGE UP (ref 96–108)
CO2 SERPL-SCNC: 36 MMOL/L — HIGH (ref 22–31)
CREAT SERPL-MCNC: 0.86 MG/DL — SIGNIFICANT CHANGE UP (ref 0.5–1.3)
GLUCOSE SERPL-MCNC: 88 MG/DL — SIGNIFICANT CHANGE UP (ref 70–99)
HCT VFR BLD CALC: 34.7 % — LOW (ref 39–50)
HGB BLD-MCNC: 10.9 G/DL — LOW (ref 13–17)
MCHC RBC-ENTMCNC: 29.1 PG — SIGNIFICANT CHANGE UP (ref 27–34)
MCHC RBC-ENTMCNC: 31.4 GM/DL — LOW (ref 32–36)
MCV RBC AUTO: 92.5 FL — SIGNIFICANT CHANGE UP (ref 80–100)
NRBC # BLD: 0 /100 WBCS — SIGNIFICANT CHANGE UP (ref 0–0)
PLATELET # BLD AUTO: 204 K/UL — SIGNIFICANT CHANGE UP (ref 150–400)
POTASSIUM SERPL-MCNC: 3.8 MMOL/L — SIGNIFICANT CHANGE UP (ref 3.5–5.3)
POTASSIUM SERPL-SCNC: 3.8 MMOL/L — SIGNIFICANT CHANGE UP (ref 3.5–5.3)
PROT SERPL-MCNC: 6.6 G/DL — SIGNIFICANT CHANGE UP (ref 6–8.3)
RBC # BLD: 3.75 M/UL — LOW (ref 4.2–5.8)
RBC # FLD: 15.4 % — HIGH (ref 10.3–14.5)
SODIUM SERPL-SCNC: 139 MMOL/L — SIGNIFICANT CHANGE UP (ref 135–145)
WBC # BLD: 8.75 K/UL — SIGNIFICANT CHANGE UP (ref 3.8–10.5)
WBC # FLD AUTO: 8.75 K/UL — SIGNIFICANT CHANGE UP (ref 3.8–10.5)

## 2020-05-18 RX ORDER — SENNA PLUS 8.6 MG/1
2 TABLET ORAL AT BEDTIME
Refills: 0 | Status: DISCONTINUED | OUTPATIENT
Start: 2020-05-18 | End: 2020-05-22

## 2020-05-18 RX ADMIN — LISINOPRIL 5 MILLIGRAM(S): 2.5 TABLET ORAL at 05:48

## 2020-05-18 RX ADMIN — HEPARIN SODIUM 5000 UNIT(S): 5000 INJECTION INTRAVENOUS; SUBCUTANEOUS at 22:10

## 2020-05-18 RX ADMIN — SENNA PLUS 2 TABLET(S): 8.6 TABLET ORAL at 22:10

## 2020-05-18 RX ADMIN — Medication 81 MILLIGRAM(S): at 12:53

## 2020-05-18 RX ADMIN — Medication 40 MILLIGRAM(S): at 05:48

## 2020-05-18 RX ADMIN — Medication 25 MILLIGRAM(S): at 05:48

## 2020-05-18 RX ADMIN — HEPARIN SODIUM 5000 UNIT(S): 5000 INJECTION INTRAVENOUS; SUBCUTANEOUS at 05:48

## 2020-05-18 RX ADMIN — FINASTERIDE 5 MILLIGRAM(S): 5 TABLET, FILM COATED ORAL at 12:53

## 2020-05-18 RX ADMIN — ATORVASTATIN CALCIUM 40 MILLIGRAM(S): 80 TABLET, FILM COATED ORAL at 22:10

## 2020-05-18 RX ADMIN — HEPARIN SODIUM 5000 UNIT(S): 5000 INJECTION INTRAVENOUS; SUBCUTANEOUS at 14:24

## 2020-05-18 NOTE — PROGRESS NOTE ADULT - SUBJECTIVE AND OBJECTIVE BOX
[facility] Progress Note [phone] (Dr. Lowery, Xuan, Loretta, Debo)    CHIEF COMPLAINT: Patient is a 89y old  Male who presents with a chief complaint of fracture (17 May 2020 18:10)      Follow Up Today: The patient denies any chest discomfort or shortness of breath.    HPI:  89 y/o male with PMHx Anxiety, Atrial fibrillation, CAD, High cholesterol, HTN BIBA from [facility] due to right leg pain. Pt reports fall 4 days ago. Pt reports he is unable to ambulate due to pain. Pt also reports new knee pain, no recent injury. Pt notes he uses a walker at home but unable to bear weight. pt describes pain as aching, non-radiating, and currently 3/10 at rest. pt denies recent fall, fever, chills, cp, SOB, numbness/weakness, cough, HA, or any other complaints. (15 May 2020 22:28)      PAST MEDICAL & SURGICAL HISTORY:  HTN (hypertension)  Atrial fibrillation  CAD (coronary artery disease)  High cholesterol  Anxiety  No significant past surgical history      MEDICATIONS  (STANDING):  aspirin  chewable 81 milliGRAM(s) Oral daily  atorvastatin 40 milliGRAM(s) Oral at bedtime  finasteride 5 milliGRAM(s) Oral daily  furosemide    Tablet 40 milliGRAM(s) Oral daily  heparin   Injectable 5000 Unit(s) SubCutaneous every 8 hours  lisinopril 5 milliGRAM(s) Oral daily  metoprolol succinate ER 25 milliGRAM(s) Oral daily    MEDICATIONS  (PRN):  acetaminophen   Tablet .. 650 milliGRAM(s) Oral every 6 hours PRN Temp greater or equal to 38C (100.4F), Mild Pain (1 - 3)  ALPRAZolam 0.25 milliGRAM(s) Oral daily PRN anxiety  morphine  - Injectable 2 milliGRAM(s) IV Push every 6 hours PRN Severe Pain (7 - 10)  oxycodone    5 mG/acetaminophen 325 mG 1 Tablet(s) Oral every 6 hours PRN Moderate Pain (4 - 6)  zolpidem 5 milliGRAM(s) Oral at bedtime PRN Insomnia      Allergies    IV Contrast dye (Nausea)  No Known Drug Allergies    Intolerances        REVIEW OF SYSTEMS:    All other review of systems is negative unless indicated above    Vital Signs Last 24 Hrs  T(C): 36.4 (18 May 2020 05:04), Max: 36.5 (17 May 2020 13:40)  T(F): 97.6 (18 May 2020 05:04), Max: 97.7 (17 May 2020 13:40)  HR: 64 (18 May 2020 05:04) (64 - 64)  BP: 118/77 (18 May 2020 05:04) (97/64 - 118/77)  BP(mean): --  RR: 17 (18 May 2020 05:04) (17 - 17)  SpO2: 92% (18 May 2020 05:04) (92% - 98%)    I&O's Summary    17 May 2020 07:01  -  18 May 2020 07:00  --------------------------------------------------------  IN: 0 mL / OUT: 650 mL / NET: -650 mL        PHYSICAL EXAM:    Constitutional: NAD, awake and alert, well-developed  Eyes:  EOMI,  Pupils round, No oral cyanosis.  HEENT: No exudate or erythema  Pulmonary: Non-labored, breath sounds are clear bilaterally, No wheezing, rales or rhonchi  Cardiovascular: Regular, S1 and S2, No murmurs, rubs, gallops oir clicks  Gastrointestinal: Bowel Sounds present, soft, nontender.   Ext: No significant LE edema with good pulses x 4  Neurological: Alert, no gross focal motor deficits  Skin: No rashes.  Psych:  Mood & affect appropriate    LABS: All Labs Reviewed:                        10.9   8.75  )-----------( 204      ( 18 May 2020 08:09 )             34.7                         10.6   7.83  )-----------( 199      ( 17 May 2020 07:49 )             34.5                         9.6    6.38  )-----------( 191      ( 16 May 2020 07:36 )             30.8     18 May 2020 08:09    139    |  99     |  22     ----------------------------<  88     3.8     |  36     |  0.86   17 May 2020 07:49    142    |  102    |  22     ----------------------------<  91     4.2     |  34     |  0.96   16 May 2020 07:36    142    |  102    |  28     ----------------------------<  86     3.5     |  32     |  0.92     Ca    9.2        18 May 2020 08:09  Ca    8.9        17 May 2020 07:49  Ca    8.7        16 May 2020 07:36    TPro  6.6    /  Alb  2.7    /  TBili  1.0    /  DBili  x      /  AST  20     /  ALT  14     /  AlkPhos  75     18 May 2020 08:09  TPro  6.7    /  Alb  2.8    /  TBili  1.0    /  DBili  x      /  AST  23     /  ALT  16     /  AlkPhos  76     17 May 2020 07:49  TPro  6.1    /  Alb  2.7    /  TBili  1.0    /  DBili  x      /  AST  24     /  ALT  15     /  AlkPhos  66     16 May 2020 07:36          Blood Culture:         RADIOLOGY/EKG:  < from: CT Pelvis Bony Only No Cont (05.15.20 @ 23:48) >    Bones: Somewhat heterogeneous pattern of decreased bone mineralization, notable in the lumbar spine. Acute, comminuted and mildly posteromedially displaced fracture of the right greater trochanter. No dislocation.     Bilateral hip osteoarthrosis. Degenerative changes of the visualized lower lumbar spine, pubic symphysis and sacroiliac joints. Renal anterolisthesis of L4 on L5 and L5 on S1.    Soft tissue: A 1.4 x 2.3 x 5.6 cm structure measuring simple fluid to slightly greater than simple fluid attenuation with surrounding stranding, which may represent a resolving hematoma/edema. No significant hip joint effusion.     Diffuse muscle bulk loss with fatty replacement. Chondrocalcinosis at the pubic symphysis. Postsurgical changes along the lower anterior abdominal/pelvic wall soft tissue. Fat-containing left > right inguinal hernia.    Additional: Colon diverticulosis. Partially visualized left renal cyst. Postsurgical changes in the prostate. Atherosclerosis. Ectatic infrarenal aorta (2.4 x 2.5 standard). 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 Infrarenal IVC filter.     IMPRESSION:    Acute, comminuted and mildly posteromedially displaced fracture of the right greater trochanter. Follow-up MRI may be obtained for further evaluation of fracture extension and additional potential fracture.    Heterogenous pattern of decreased bone mineralization, which may be due to osteopenia although marrow infiltrative/replacing process (i.e. neoplasms/metastasis) cannot be excluded. Recommend clinical correlation and follow-up.    Additional findings as described.    < end of copied text >    Attending Attestation:   20 minutes spent on total encounter; more than 50% of the visit was spent counseling and/or coordinating care by the attending physician.     ASSESSMENT AND PLAN

## 2020-05-18 NOTE — OCCUPATIONAL THERAPY INITIAL EVALUATION ADULT - PERTINENT HX OF CURRENT PROBLEM, REHAB EVAL
89 y/o male with PMH Anxiety, Atrial fibrillation, CAD, High cholesterol, HTN BIBA from [facility] due to right leg pain s/p fall. Pt admitted with closed displaced right greater trochanter fx, CT head (-).

## 2020-05-18 NOTE — PROGRESS NOTE ADULT - ASSESSMENT
Problem/Plan - 1:  ·  Problem: Closed displaced fracture of greater trochanter of right femur, initial encounter.  Plan: OR as per ortho   pain control  OR once cleared by cards   will monitor.     Problem/Plan - 2:  ·  Problem: Atrial fibrillation.  Plan: telemonitor cards fu       Problem/Plan - 3:  ·  Problem: CAD (coronary artery disease).  Plan: telemonitor   cw home meds  cardiology fu appreciated Problem/Plan - 1:  ·  Problem: Closed displaced fracture of greater trochanter of right femur, initial encounter.  Plan: OR as per ortho   pain control  no surgery as per ortho   will monitor.     Problem/Plan - 2:  ·  Problem: Atrial fibrillation.  Plan: telemonitor   cards fu   no AC sec to hx of GIB     Problem/Plan - 3:  ·  Problem: CAD (coronary artery disease).  Plan: telemonitor   cw home meds  cardiology fu appreciated     PT and dc planning to [facility]

## 2020-05-18 NOTE — PROGRESS NOTE ADULT - ASSESSMENT
89M with known AF not on AC with h/o bleeding, HTN, HLD, CAD s/p TAVR admitted with inability to ambulate and severe RIGHT leg pain s/p fall 4 days PTA. Found with RIGHT hip fx.   CAD- asymptomatic  TAVR- asymptomatic, Echo pending  HTN- controlled  A fib- rate controlled not on AC    Await bone scan for possible sx. 89M with known AF not on AC with h/o bleeding, HTN, HLD, CAD s/p TAVR admitted with inability to ambulate and severe RIGHT leg pain s/p fall 4 days PTA. Found with RIGHT hip fx. No planned surgery on the hip.    < from: TTE Echo Complete w/o contrast w/ Doppler (05.17.20 @ 10:15) >    Conclusion:   1. This a very technically limited study.  2. There is normal left ventricular size and left ventricular systolic function with an ejection fraction of 65-70%.  3. There is mild left ventricular hypertrophy with asymmetric septal hypertrophy.  4. There is significant mitral annular calcification with trivial mitral regurgitation. The mitral valvearea was 2.1 sq cm based on a mitral valve pressure half time of 90 ms.  5. There is diastolic dysfunction.  6. There is tricuspid annular calcification with mild tricuspid regurgitation and pacemaker wires visualized in the right atrium and right ventricle..  7. There is biatrial enlargement noted.  8. There is a bioprosthetic aortic valve noted. The mean gradient is 14.2 mmHg and the peak aortic valve gradient is 29.3 mmHg  9. There is no significant pericardial effusion.    < end of copied text >      CAD- asymptomatic, c/w metoprolol/lisinopril, lipitor and baby aspirin daily  TAVR- asymptomatic with ok valve gradients  HTN- controlled on above meds  A fib- rate controlled not on AC given history of bleeding and falls.  DVT prophylaxis  PT    No further inpatient cardiac work up  Please him follow up with me as outpatient in 4 weeks.  [phone]

## 2020-05-18 NOTE — OCCUPATIONAL THERAPY INITIAL EVALUATION ADULT - ADDITIONAL COMMENTS
Pt lives at the [facility]. Pt ambulated using a rolling walker. [facility] Pt has a stall shower with grab bars and shower chair. Pt ambulated using a rollator. Pt owns a rollator, shower chair, grab bars by toilet and a rolling walker. Pt reports that he was independent in ADL's and ambulation using assistive devices/DME prior to recent fall. Pt wears bilateral hearing aides. Pt requires assistance with ADL's and transfers due to PWB 50% Right LE, decreased strength, decreased endurance and impaired sitting/standing balance.

## 2020-05-18 NOTE — PROGRESS NOTE ADULT - SUBJECTIVE AND OBJECTIVE BOX
Date/Time Patient Seen:  		  Referring MD:   Data Reviewed	       Patient is a 89y old  Male who presents with a chief complaint of fracture (17 May 2020 18:10)      Subjective/HPI     PAST MEDICAL & SURGICAL HISTORY:  HTN (hypertension)  Atrial fibrillation  CAD (coronary artery disease)  High cholesterol  Anxiety  No significant past surgical history        Medication list         MEDICATIONS  (STANDING):  aspirin  chewable 81 milliGRAM(s) Oral daily  atorvastatin 40 milliGRAM(s) Oral at bedtime  finasteride 5 milliGRAM(s) Oral daily  furosemide    Tablet 40 milliGRAM(s) Oral daily  heparin   Injectable 5000 Unit(s) SubCutaneous every 8 hours  lisinopril 5 milliGRAM(s) Oral daily  metoprolol succinate ER 25 milliGRAM(s) Oral daily    MEDICATIONS  (PRN):  acetaminophen   Tablet .. 650 milliGRAM(s) Oral every 6 hours PRN Temp greater or equal to 38C (100.4F), Mild Pain (1 - 3)  ALPRAZolam 0.25 milliGRAM(s) Oral daily PRN anxiety  morphine  - Injectable 2 milliGRAM(s) IV Push every 6 hours PRN Severe Pain (7 - 10)  oxycodone    5 mG/acetaminophen 325 mG 1 Tablet(s) Oral every 6 hours PRN Moderate Pain (4 - 6)  zolpidem 5 milliGRAM(s) Oral at bedtime PRN Insomnia         Vitals log        ICU Vital Signs Last 24 Hrs  T(C): 36.4 (18 May 2020 05:04), Max: 36.5 (17 May 2020 13:40)  T(F): 97.6 (18 May 2020 05:04), Max: 97.7 (17 May 2020 13:40)  HR: 64 (18 May 2020 05:04) (64 - 64)  BP: 118/77 (18 May 2020 05:04) (97/64 - 118/77)  BP(mean): --  ABP: --  ABP(mean): --  RR: 17 (18 May 2020 05:04) (17 - 17)  SpO2: 92% (18 May 2020 05:04) (92% - 98%)           Input and Output:  I&O's Detail    16 May 2020 07:01  -  17 May 2020 07:00  --------------------------------------------------------  IN:  Total IN: 0 mL    OUT:    Intermittent Catheterization - Urethral: 2375 mL  Total OUT: 2375 mL    Total NET: -2375 mL      17 May 2020 07:01  -  18 May 2020 06:12  --------------------------------------------------------  IN:  Total IN: 0 mL    OUT:    Intermittent Catheterization - Urethral: 650 mL  Total OUT: 650 mL    Total NET: -650 mL          Lab Data                        10.6   7.83  )-----------( 199      ( 17 May 2020 07:49 )             34.5     05-17    142  |  102  |  22  ----------------------------<  91  4.2   |  34<H>  |  0.96    Ca    8.9      17 May 2020 07:49    TPro  6.7  /  Alb  2.8<L>  /  TBili  1.0  /  DBili  x   /  AST  23  /  ALT  16  /  AlkPhos  76  05-17            Review of Systems	      Objective     Physical Examination    heart s1s2  lung dec BS  abd soft  head nc  head at      Pertinent Lab findings & Imaging      Alec:  NO   Adequate UO     I&O's Detail    16 May 2020 07:01  -  17 May 2020 07:00  --------------------------------------------------------  IN:  Total IN: 0 mL    OUT:    Intermittent Catheterization - Urethral: 2375 mL  Total OUT: 2375 mL    Total NET: -2375 mL      17 May 2020 07:01  -  18 May 2020 06:12  --------------------------------------------------------  IN:  Total IN: 0 mL    OUT:    Intermittent Catheterization - Urethral: 650 mL  Total OUT: 650 mL    Total NET: -650 mL               Discussed with:     Cultures:	        Radiology

## 2020-05-18 NOTE — PROGRESS NOTE ADULT - PROBLEM SELECTOR PLAN 1
no surgical intervention planned as per Ortho - PT - and Rehab  right hip fx - OP - OA - Fall  cvs rx regimen  pain assessment  CXR clear   TTE pending - cardiac optimization -   dvt p at present  on o2 support - I hanh -   labs and imaging reviewed -   discussed GOC with the patient -.

## 2020-05-18 NOTE — PROGRESS NOTE ADULT - SUBJECTIVE AND OBJECTIVE BOX
Patient is a 89y old  Male who presents with a chief complaint of fracture (17 May 2020 18:10)      INTERVAL HPI/OVERNIGHT EVENTS:  T(C): 36.8 (05-18-20 @ 14:09), Max: 36.8 (05-18-20 @ 14:09)  HR: 65 (05-18-20 @ 14:09) (64 - 65)  BP: 103/62 (05-18-20 @ 14:09) (103/62 - 118/77)  RR: 16 (05-18-20 @ 14:09) (16 - 17)  SpO2: 94% (05-18-20 @ 14:09) (92% - 94%)  Wt(kg): --  I&O's Summary    17 May 2020 07:01  -  18 May 2020 07:00  --------------------------------------------------------  IN: 0 mL / OUT: 650 mL / NET: -650 mL    18 May 2020 07:01  -  18 May 2020 15:00  --------------------------------------------------------  IN: 0 mL / OUT: 2000 mL / NET: -2000 mL        LABS:                        10.9   8.75  )-----------( 204      ( 18 May 2020 08:09 )             34.7     05-18    139  |  99  |  22  ----------------------------<  88  3.8   |  36<H>  |  0.86    Ca    9.2      18 May 2020 08:09    TPro  6.6  /  Alb  2.7<L>  /  TBili  1.0  /  DBili  x   /  AST  20  /  ALT  14  /  AlkPhos  75  05-18        CAPILLARY BLOOD GLUCOSE                MEDICATIONS  (STANDING):  aspirin  chewable 81 milliGRAM(s) Oral daily  atorvastatin 40 milliGRAM(s) Oral at bedtime  finasteride 5 milliGRAM(s) Oral daily  furosemide    Tablet 40 milliGRAM(s) Oral daily  heparin   Injectable 5000 Unit(s) SubCutaneous every 8 hours  lisinopril 5 milliGRAM(s) Oral daily  metoprolol succinate ER 25 milliGRAM(s) Oral daily    MEDICATIONS  (PRN):  acetaminophen   Tablet .. 650 milliGRAM(s) Oral every 6 hours PRN Temp greater or equal to 38C (100.4F), Mild Pain (1 - 3)  ALPRAZolam 0.25 milliGRAM(s) Oral daily PRN anxiety  morphine  - Injectable 2 milliGRAM(s) IV Push every 6 hours PRN Severe Pain (7 - 10)  oxycodone    5 mG/acetaminophen 325 mG 1 Tablet(s) Oral every 6 hours PRN Moderate Pain (4 - 6)  zolpidem 5 milliGRAM(s) Oral at bedtime PRN Insomnia          PHYSICAL EXAM:  GENERAL: NAD, well-groomed, well-developed  HEAD:  Atraumatic, Normocephalic  CHEST/LUNG: Clear to percussion bilaterally; No rales, rhonchi, wheezing, or rubs  HEART: Regular rate and rhythm; No murmurs, rubs, or gallops  ABDOMEN: Soft, Nontender, Nondistended; Bowel sounds present  EXTREMITIES:  2+ Peripheral Pulses, No clubbing, cyanosis, or edema  LYMPH: No lymphadenopathy noted  SKIN: No rashes or lesions    Care Discussed with Consultants/Other Providers [ ] YES  [ ] NO

## 2020-05-18 NOTE — GOALS OF CARE CONVERSATION - ADVANCED CARE PLANNING - CONVERSATION DETAILS
met pt, living will, hcp, pt wants talk dnr: face time with daughterOdilia, with pt, corinne reviewed: pt agrees to dnr dni no TF, wants treatment: IV fluids & antibiotics. pr requested Dr Erazo to complete molst form. daughter supports pt decisions. PC RN contact # given

## 2020-05-19 LAB
ALBUMIN SERPL ELPH-MCNC: 2.9 G/DL — LOW (ref 3.3–5)
ALP SERPL-CCNC: 81 U/L — SIGNIFICANT CHANGE UP (ref 40–120)
ALT FLD-CCNC: 16 U/L — SIGNIFICANT CHANGE UP (ref 12–78)
ANION GAP SERPL CALC-SCNC: 5 MMOL/L — SIGNIFICANT CHANGE UP (ref 5–17)
AST SERPL-CCNC: 20 U/L — SIGNIFICANT CHANGE UP (ref 15–37)
BILIRUB SERPL-MCNC: 1 MG/DL — SIGNIFICANT CHANGE UP (ref 0.2–1.2)
BUN SERPL-MCNC: 22 MG/DL — SIGNIFICANT CHANGE UP (ref 7–23)
CALCIUM SERPL-MCNC: 9.4 MG/DL — SIGNIFICANT CHANGE UP (ref 8.5–10.1)
CHLORIDE SERPL-SCNC: 99 MMOL/L — SIGNIFICANT CHANGE UP (ref 96–108)
CO2 SERPL-SCNC: 34 MMOL/L — HIGH (ref 22–31)
CREAT SERPL-MCNC: 0.92 MG/DL — SIGNIFICANT CHANGE UP (ref 0.5–1.3)
GLUCOSE SERPL-MCNC: 93 MG/DL — SIGNIFICANT CHANGE UP (ref 70–99)
HCT VFR BLD CALC: 37.8 % — LOW (ref 39–50)
HGB BLD-MCNC: 11.4 G/DL — LOW (ref 13–17)
MCHC RBC-ENTMCNC: 28.5 PG — SIGNIFICANT CHANGE UP (ref 27–34)
MCHC RBC-ENTMCNC: 30.2 GM/DL — LOW (ref 32–36)
MCV RBC AUTO: 94.5 FL — SIGNIFICANT CHANGE UP (ref 80–100)
NRBC # BLD: 0 /100 WBCS — SIGNIFICANT CHANGE UP (ref 0–0)
PLATELET # BLD AUTO: 269 K/UL — SIGNIFICANT CHANGE UP (ref 150–400)
POTASSIUM SERPL-MCNC: 4.1 MMOL/L — SIGNIFICANT CHANGE UP (ref 3.5–5.3)
POTASSIUM SERPL-SCNC: 4.1 MMOL/L — SIGNIFICANT CHANGE UP (ref 3.5–5.3)
PROT SERPL-MCNC: 7.1 G/DL — SIGNIFICANT CHANGE UP (ref 6–8.3)
RBC # BLD: 4 M/UL — LOW (ref 4.2–5.8)
RBC # FLD: 15.2 % — HIGH (ref 10.3–14.5)
SODIUM SERPL-SCNC: 138 MMOL/L — SIGNIFICANT CHANGE UP (ref 135–145)
WBC # BLD: 9.75 K/UL — SIGNIFICANT CHANGE UP (ref 3.8–10.5)
WBC # FLD AUTO: 9.75 K/UL — SIGNIFICANT CHANGE UP (ref 3.8–10.5)

## 2020-05-19 RX ADMIN — HEPARIN SODIUM 5000 UNIT(S): 5000 INJECTION INTRAVENOUS; SUBCUTANEOUS at 13:57

## 2020-05-19 RX ADMIN — FINASTERIDE 5 MILLIGRAM(S): 5 TABLET, FILM COATED ORAL at 12:36

## 2020-05-19 RX ADMIN — HEPARIN SODIUM 5000 UNIT(S): 5000 INJECTION INTRAVENOUS; SUBCUTANEOUS at 22:06

## 2020-05-19 RX ADMIN — Medication 40 MILLIGRAM(S): at 06:07

## 2020-05-19 RX ADMIN — LISINOPRIL 5 MILLIGRAM(S): 2.5 TABLET ORAL at 06:07

## 2020-05-19 RX ADMIN — ATORVASTATIN CALCIUM 40 MILLIGRAM(S): 80 TABLET, FILM COATED ORAL at 22:06

## 2020-05-19 RX ADMIN — HEPARIN SODIUM 5000 UNIT(S): 5000 INJECTION INTRAVENOUS; SUBCUTANEOUS at 06:07

## 2020-05-19 RX ADMIN — Medication 25 MILLIGRAM(S): at 06:07

## 2020-05-19 RX ADMIN — Medication 81 MILLIGRAM(S): at 12:36

## 2020-05-19 RX ADMIN — SENNA PLUS 2 TABLET(S): 8.6 TABLET ORAL at 22:06

## 2020-05-19 NOTE — PROGRESS NOTE ADULT - ASSESSMENT
89M with known AF not on AC with h/o bleeding, HTN, HLD, CAD s/p TAVR admitted with inability to ambulate and severe RIGHT leg pain s/p fall 4 days PTA. Found with RIGHT hip fx. No planned surgery on the hip.    < from: TTE Echo Complete w/o contrast w/ Doppler (05.17.20 @ 10:15) >    Conclusion:   1. This a very technically limited study.  2. There is normal left ventricular size and left ventricular systolic function with an ejection fraction of 65-70%.  3. There is mild left ventricular hypertrophy with asymmetric septal hypertrophy.  4. There is significant mitral annular calcification with trivial mitral regurgitation. The mitral valvearea was 2.1 sq cm based on a mitral valve pressure half time of 90 ms.  5. There is diastolic dysfunction.  6. There is tricuspid annular calcification with mild tricuspid regurgitation and pacemaker wires visualized in the right atrium and right ventricle..  7. There is biatrial enlargement noted.  8. There is a bioprosthetic aortic valve noted. The mean gradient is 14.2 mmHg and the peak aortic valve gradient is 29.3 mmHg  9. There is no significant pericardial effusion.    < end of copied text >      CAD- asymptomatic, c/w metoprolol/lisinopril, lipitor and baby aspirin daily  TAVR- asymptomatic with ok valve gradients  HTN- controlled on above meds  A fib- rate controlled not on AC given history of bleeding and falls.  DVT prophylaxis  PT    No further inpatient cardiac work up  Please him follow up with me as outpatient in 4 weeks.  [phone]

## 2020-05-19 NOTE — PROGRESS NOTE ADULT - SUBJECTIVE AND OBJECTIVE BOX
Patient is a 89y old  Male who presents with a chief complaint of RIGHT LEG PAIN (19 May 2020 09:07)      INTERVAL HPI/OVERNIGHT EVENTS:  T(C): 36.6 (05-19-20 @ 13:37), Max: 36.9 (05-18-20 @ 20:21)  HR: 83 (05-19-20 @ 13:37) (66 - 83)  BP: 93/61 (05-19-20 @ 13:37) (93/61 - 111/68)  RR: 17 (05-19-20 @ 13:37) (16 - 17)  SpO2: 92% (05-19-20 @ 13:37) (92% - 93%)  Wt(kg): --  I&O's Summary    18 May 2020 07:01  -  19 May 2020 07:00  --------------------------------------------------------  IN: 0 mL / OUT: 2650 mL / NET: -2650 mL        LABS:                        11.4   9.75  )-----------( 269      ( 19 May 2020 09:04 )             37.8     05-19    138  |  99  |  22  ----------------------------<  93  4.1   |  34<H>  |  0.92    Ca    9.4      19 May 2020 09:04    TPro  7.1  /  Alb  2.9<L>  /  TBili  1.0  /  DBili  x   /  AST  20  /  ALT  16  /  AlkPhos  81  05-19        CAPILLARY BLOOD GLUCOSE                MEDICATIONS  (STANDING):  aspirin  chewable 81 milliGRAM(s) Oral daily  atorvastatin 40 milliGRAM(s) Oral at bedtime  finasteride 5 milliGRAM(s) Oral daily  furosemide    Tablet 40 milliGRAM(s) Oral daily  heparin   Injectable 5000 Unit(s) SubCutaneous every 8 hours  lisinopril 5 milliGRAM(s) Oral daily  metoprolol succinate ER 25 milliGRAM(s) Oral daily  senna 2 Tablet(s) Oral at bedtime    MEDICATIONS  (PRN):  acetaminophen   Tablet .. 650 milliGRAM(s) Oral every 6 hours PRN Temp greater or equal to 38C (100.4F), Mild Pain (1 - 3)  ALPRAZolam 0.25 milliGRAM(s) Oral daily PRN anxiety  morphine  - Injectable 2 milliGRAM(s) IV Push every 6 hours PRN Severe Pain (7 - 10)  oxycodone    5 mG/acetaminophen 325 mG 1 Tablet(s) Oral every 6 hours PRN Moderate Pain (4 - 6)  zolpidem 5 milliGRAM(s) Oral at bedtime PRN Insomnia          PHYSICAL EXAM:  GENERAL: frail  CHEST/LUNG: Clear to percussion bilaterally; No rales, rhonchi, wheezing, or rubs  HEART: Regular rate and rhythm; No murmurs, rubs, or gallops  ABDOMEN: Soft, Nontender, Nondistended; Bowel sounds present  EXTREMITIES:  no edema     Care Discussed with Consultants/Other Providers [ ] YES  [ ] NO

## 2020-05-19 NOTE — CHART NOTE - NSCHARTNOTEFT_GEN_A_CORE
Assessment: 89 y/o male adm with right leg pain, fx right femur. Pt may possibly need OR?? Pt states that his appetite is fair. Wants Ensure supplement. Order verification in chart for MD to sign. Attempted to contact MD. Will provide pt with healthshakes TID at this time. Pt needs meals cut up, will provide. MOLST states DNR, DNI, no TF.     Factors impacting intake: [ ] none [ ] nausea  [ ] vomiting [ ] diarrhea [ ] constipation  [ ]chewing problems [ ] swallowing issues  [x ] other: appetite decreased over the yrs (pt never consumes 100% of entrees anymore)     Diet Presciption: Diet, Regular (05-18-20 @ 01:39)    Intake: 50-75%    Current Weight: 5/15 154.9#  % Weight Change    Pertinent Medications: MEDICATIONS  (STANDING):  aspirin  chewable 81 milliGRAM(s) Oral daily  atorvastatin 40 milliGRAM(s) Oral at bedtime  finasteride 5 milliGRAM(s) Oral daily  furosemide    Tablet 40 milliGRAM(s) Oral daily  heparin   Injectable 5000 Unit(s) SubCutaneous every 8 hours  lisinopril 5 milliGRAM(s) Oral daily  metoprolol succinate ER 25 milliGRAM(s) Oral daily  senna 2 Tablet(s) Oral at bedtime    MEDICATIONS  (PRN):  acetaminophen   Tablet .. 650 milliGRAM(s) Oral every 6 hours PRN Temp greater or equal to 38C (100.4F), Mild Pain (1 - 3)  ALPRAZolam 0.25 milliGRAM(s) Oral daily PRN anxiety  morphine  - Injectable 2 milliGRAM(s) IV Push every 6 hours PRN Severe Pain (7 - 10)  oxycodone    5 mG/acetaminophen 325 mG 1 Tablet(s) Oral every 6 hours PRN Moderate Pain (4 - 6)  zolpidem 5 milliGRAM(s) Oral at bedtime PRN Insomnia    Pertinent Labs: 05-19 Na138 mmol/L Glu 93 mg/dL K+ 4.1 mmol/L Cr  0.92 mg/dL BUN 22 mg/dL 05-19 Alb 2.9 g/dL<L>     CAPILLARY BLOOD GLUCOSE        Skin: Stage II right buttocks  Sacrum/coccyx Stage I      Estimated Needs:   [x ] no change since previous assessment  [ ] recalculated:     Previous Nutrition Diagnosis:   [ ] Inadequate Energy Intake [ ]Inadequate Oral Intake [ ] Excessive Energy Intake   [ ] Underweight [ ] Increased Nutrient Needs [ ] Overweight/Obesity   [ ] Altered GI Function [ ] Unintended Weight Loss [ ] Food & Nutrition Related Knowledge Deficit [ x] Malnutrition     Nutrition Diagnosis is [ x] ongoing  [ ] resolved [ ] not applicable     New Nutrition Diagnosis: [ x] not applicable       Interventions:   Recommend  [ ] Change Diet To:  [ x] Nutrition Supplement: Rx Ensure BID  [ ] Nutrition Support  [x] Other: will provide pt with healthshakes TID; cut up meals prior to delivery.     Monitoring and Evaluation:   [x ] PO intake [ x ] Tolerance to diet prescription [ x ] weights [ x ] labs[ x ] follow up per protocol  [ x] other: skin

## 2020-05-19 NOTE — PROGRESS NOTE ADULT - SUBJECTIVE AND OBJECTIVE BOX
[facility] Cardiology Progress Note [phone] (Dr. Lowery, Xuan, Loretta, Debo)    CHIEF COMPLAINT: Patient is a 89y old  Male who presents with a chief complaint of fracture (17 May 2020 18:10)      Follow Up Today: The patient denies any chest discomfort or shortness of breath.    HPI:  89 y/o male with PMHx Anxiety, Atrial fibrillation, CAD, High cholesterol, HTN BIBA from [facility] due to right leg pain. Pt reports fall 4 days ago. Pt reports he is unable to ambulate due to pain. Pt also reports new knee pain, no recent injury. Pt notes he uses a walker at home but unable to bear weight. pt describes pain as aching, non-radiating, and currently 3/10 at rest. pt denies recent fall, fever, chills, cp, SOB, numbness/weakness, cough, HA, or any other complaints. (15 May 2020 22:28)      PAST MEDICAL & SURGICAL HISTORY:  HTN (hypertension)  Atrial fibrillation  CAD (coronary artery disease)  High cholesterol  Anxiety  No significant past surgical history      MEDICATIONS  (STANDING):  aspirin  chewable 81 milliGRAM(s) Oral daily  atorvastatin 40 milliGRAM(s) Oral at bedtime  finasteride 5 milliGRAM(s) Oral daily  furosemide    Tablet 40 milliGRAM(s) Oral daily  heparin   Injectable 5000 Unit(s) SubCutaneous every 8 hours  lisinopril 5 milliGRAM(s) Oral daily  metoprolol succinate ER 25 milliGRAM(s) Oral daily  senna 2 Tablet(s) Oral at bedtime    MEDICATIONS  (PRN):  acetaminophen   Tablet .. 650 milliGRAM(s) Oral every 6 hours PRN Temp greater or equal to 38C (100.4F), Mild Pain (1 - 3)  ALPRAZolam 0.25 milliGRAM(s) Oral daily PRN anxiety  morphine  - Injectable 2 milliGRAM(s) IV Push every 6 hours PRN Severe Pain (7 - 10)  oxycodone    5 mG/acetaminophen 325 mG 1 Tablet(s) Oral every 6 hours PRN Moderate Pain (4 - 6)  zolpidem 5 milliGRAM(s) Oral at bedtime PRN Insomnia      Allergies    IV Contrast dye (Nausea)  No Known Drug Allergies    Intolerances        REVIEW OF SYSTEMS:    All other review of systems is negative unless indicated above    Vital Signs Last 24 Hrs  T(C): 36.4 (19 May 2020 04:25), Max: 36.9 (18 May 2020 20:21)  T(F): 97.5 (19 May 2020 04:25), Max: 98.5 (18 May 2020 20:21)  HR: 66 (19 May 2020 04:25) (65 - 68)  BP: 111/68 (19 May 2020 04:25) (103/62 - 111/68)  BP(mean): --  RR: 16 (19 May 2020 04:25) (16 - 16)  SpO2: 93% (19 May 2020 04:25) (92% - 94%)    I&O's Summary    18 May 2020 07:01  -  19 May 2020 07:00  --------------------------------------------------------  IN: 0 mL / OUT: 2650 mL / NET: -2650 mL        PHYSICAL EXAM:    Constitutional: NAD, awake and alert, well-developed  Eyes:  EOMI,  Pupils round, No oral cyanosis.  HEENT: No exudate or erythema  Pulmonary: Non-labored, breath sounds are clear bilaterally, No wheezing, rales or rhonchi  Cardiovascular: Regular, S1 and S2, No murmurs, rubs, gallops oir clicks  Gastrointestinal: Bowel Sounds present, soft, nontender.   Ext: No significant LE edema with good pulses x 4  Neurological: Alert, no gross focal motor deficits  Skin: No rashes.  Psych:  Mood & affect appropriate    LABS: All Labs Reviewed:                        10.9   8.75  )-----------( 204      ( 18 May 2020 08:09 )             34.7                         10.6   7.83  )-----------( 199      ( 17 May 2020 07:49 )             34.5     18 May 2020 08:09    139    |  99     |  22     ----------------------------<  88     3.8     |  36     |  0.86   17 May 2020 07:49    142    |  102    |  22     ----------------------------<  91     4.2     |  34     |  0.96     Ca    9.2        18 May 2020 08:09  Ca    8.9        17 May 2020 07:49    TPro  6.6    /  Alb  2.7    /  TBili  1.0    /  DBili  x      /  AST  20     /  ALT  14     /  AlkPhos  75     18 May 2020 08:09  TPro  6.7    /  Alb  2.8    /  TBili  1.0    /  DBili  x      /  AST  23     /  ALT  16     /  AlkPhos  76     17 May 2020 07:49          Blood Culture:         RADIOLOGY/EKG:    Attending Attestation:   20 minutes spent on total encounter; more than 50% of the visit was spent counseling and/or coordinating care by the attending physician.     ASSESSMENT AND PLAN [facility] Progress Note [phone] (Dr. Lowery, Xuan, Loretta, Debo)    CHIEF COMPLAINT: Patient is a 89y old  Male who presents with a chief complaint of fracture (17 May 2020 18:10)      Follow Up Today: The patient denies any chest discomfort or shortness of breath. He is eager to go to [facility].    HPI:  89 y/o male with PMHx Anxiety, Atrial fibrillation, CAD, High cholesterol, HTN BIBA from [facility] due to right leg pain. Pt reports fall 4 days ago. Pt reports he is unable to ambulate due to pain. Pt also reports new knee pain, no recent injury. Pt notes he uses a walker at home but unable to bear weight. pt describes pain as aching, non-radiating, and currently 3/10 at rest. pt denies recent fall, fever, chills, cp, SOB, numbness/weakness, cough, HA, or any other complaints. (15 May 2020 22:28)      PAST MEDICAL & SURGICAL HISTORY:  HTN (hypertension)  Atrial fibrillation  CAD (coronary artery disease)  High cholesterol  Anxiety  No significant past surgical history      MEDICATIONS  (STANDING):  aspirin  chewable 81 milliGRAM(s) Oral daily  atorvastatin 40 milliGRAM(s) Oral at bedtime  finasteride 5 milliGRAM(s) Oral daily  furosemide    Tablet 40 milliGRAM(s) Oral daily  heparin   Injectable 5000 Unit(s) SubCutaneous every 8 hours  lisinopril 5 milliGRAM(s) Oral daily  metoprolol succinate ER 25 milliGRAM(s) Oral daily  senna 2 Tablet(s) Oral at bedtime    MEDICATIONS  (PRN):  acetaminophen   Tablet .. 650 milliGRAM(s) Oral every 6 hours PRN Temp greater or equal to 38C (100.4F), Mild Pain (1 - 3)  ALPRAZolam 0.25 milliGRAM(s) Oral daily PRN anxiety  morphine  - Injectable 2 milliGRAM(s) IV Push every 6 hours PRN Severe Pain (7 - 10)  oxycodone    5 mG/acetaminophen 325 mG 1 Tablet(s) Oral every 6 hours PRN Moderate Pain (4 - 6)  zolpidem 5 milliGRAM(s) Oral at bedtime PRN Insomnia      Allergies    IV Contrast dye (Nausea)  No Known Drug Allergies    Intolerances        REVIEW OF SYSTEMS:    All other review of systems is negative unless indicated above    Vital Signs Last 24 Hrs  T(C): 36.4 (19 May 2020 04:25), Max: 36.9 (18 May 2020 20:21)  T(F): 97.5 (19 May 2020 04:25), Max: 98.5 (18 May 2020 20:21)  HR: 66 (19 May 2020 04:25) (65 - 68)  BP: 111/68 (19 May 2020 04:25) (103/62 - 111/68)  BP(mean): --  RR: 16 (19 May 2020 04:25) (16 - 16)  SpO2: 93% (19 May 2020 04:25) (92% - 94%)    I&O's Summary    18 May 2020 07:01  -  19 May 2020 07:00  --------------------------------------------------------  IN: 0 mL / OUT: 2650 mL / NET: -2650 mL        PHYSICAL EXAM:    Constitutional: NAD, awake and alert, well-developed  Eyes:  EOMI,  Pupils round, No oral cyanosis.  HEENT: No exudate or erythema  Pulmonary: Non-labored, breath sounds are clear bilaterally, No wheezing, rales or rhonchi  Cardiovascular: Regular, S1 and S2, No murmurs  Gastrointestinal: Bowel Sounds present, soft, nontender.   Ext: No significant LE edema  Neurological: Alert, no gross focal motor deficits  Skin: No rashes.  Psych:  Mood & affect appropriate    LABS: All Labs Reviewed:                        10.9   8.75  )-----------( 204      ( 18 May 2020 08:09 )             34.7                         10.6   7.83  )-----------( 199      ( 17 May 2020 07:49 )             34.5     18 May 2020 08:09    139    |  99     |  22     ----------------------------<  88     3.8     |  36     |  0.86   17 May 2020 07:49    142    |  102    |  22     ----------------------------<  91     4.2     |  34     |  0.96     Ca    9.2        18 May 2020 08:09  Ca    8.9        17 May 2020 07:49    TPro  6.6    /  Alb  2.7    /  TBili  1.0    /  DBili  x      /  AST  20     /  ALT  14     /  AlkPhos  75     18 May 2020 08:09  TPro  6.7    /  Alb  2.8    /  TBili  1.0    /  DBili  x      /  AST  23     /  ALT  16     /  AlkPhos  76     17 May 2020 07:49          Blood Culture:         RADIOLOGY/EKG:    Attending Attestation:   20 minutes spent on total encounter; more than 50% of the visit was spent counseling and/or coordinating care by the attending physician.     ASSESSMENT AND PLAN

## 2020-05-19 NOTE — PROGRESS NOTE ADULT - ASSESSMENT
Problem/Plan - 1:  ·  Problem: Closed displaced fracture of greater trochanter of right femur, initial encounter.  Plan: OR as per ortho   pain control  no surgery as per ortho   will monitor.     Problem/Plan - 2:  ·  Problem: Atrial fibrillation.  Plan: telemonitor   cards fu   no AC sec to hx of GIB     Problem/Plan - 3:  ·  Problem: CAD (coronary artery disease).  Plan: telemonitor   cw home meds  cardiology fu appreciated     PT and dc planning to [facility] after COVID swab is -ve

## 2020-05-19 NOTE — PROGRESS NOTE ADULT - PROBLEM SELECTOR PLAN 1
GOC documented - DNR DNI - MOLST filled out -   no surgical intervention planned as per Ortho - PT - and Rehab  right hip fx - OP - OA - Fall  cvs rx regimen  pain assessment  CXR clear   TTE pending - HFpEF and valv heart disease -   dvt p at present  s/p o2 support - I hanh -   labs and imaging reviewed -   discussed GOC with the patient -.

## 2020-05-19 NOTE — PROGRESS NOTE ADULT - SUBJECTIVE AND OBJECTIVE BOX
Date/Time Patient Seen:  		  Referring MD:   Data Reviewed	       Patient is a 89y old  Male who presents with a chief complaint of fracture (17 May 2020 18:10)      Subjective/HPI     PAST MEDICAL & SURGICAL HISTORY:  HTN (hypertension)  Atrial fibrillation  CAD (coronary artery disease)  High cholesterol  Anxiety  No significant past surgical history        Medication list         MEDICATIONS  (STANDING):  aspirin  chewable 81 milliGRAM(s) Oral daily  atorvastatin 40 milliGRAM(s) Oral at bedtime  finasteride 5 milliGRAM(s) Oral daily  furosemide    Tablet 40 milliGRAM(s) Oral daily  heparin   Injectable 5000 Unit(s) SubCutaneous every 8 hours  lisinopril 5 milliGRAM(s) Oral daily  metoprolol succinate ER 25 milliGRAM(s) Oral daily  senna 2 Tablet(s) Oral at bedtime    MEDICATIONS  (PRN):  acetaminophen   Tablet .. 650 milliGRAM(s) Oral every 6 hours PRN Temp greater or equal to 38C (100.4F), Mild Pain (1 - 3)  ALPRAZolam 0.25 milliGRAM(s) Oral daily PRN anxiety  morphine  - Injectable 2 milliGRAM(s) IV Push every 6 hours PRN Severe Pain (7 - 10)  oxycodone    5 mG/acetaminophen 325 mG 1 Tablet(s) Oral every 6 hours PRN Moderate Pain (4 - 6)  zolpidem 5 milliGRAM(s) Oral at bedtime PRN Insomnia         Vitals log        ICU Vital Signs Last 24 Hrs  T(C): 36.4 (19 May 2020 04:25), Max: 36.9 (18 May 2020 20:21)  T(F): 97.5 (19 May 2020 04:25), Max: 98.5 (18 May 2020 20:21)  HR: 66 (19 May 2020 04:25) (65 - 68)  BP: 111/68 (19 May 2020 04:25) (103/62 - 111/68)  BP(mean): --  ABP: --  ABP(mean): --  RR: 16 (19 May 2020 04:25) (16 - 16)  SpO2: 93% (19 May 2020 04:25) (92% - 94%)           Input and Output:  I&O's Detail    17 May 2020 07:01  -  18 May 2020 07:00  --------------------------------------------------------  IN:  Total IN: 0 mL    OUT:    Intermittent Catheterization - Urethral: 650 mL  Total OUT: 650 mL    Total NET: -650 mL      18 May 2020 07:01  -  19 May 2020 06:09  --------------------------------------------------------  IN:  Total IN: 0 mL    OUT:    Indwelling Catheter - Urethral: 1200 mL    Intermittent Catheterization - Urethral: 1000 mL  Total OUT: 2200 mL    Total NET: -2200 mL          Lab Data                        10.9   8.75  )-----------( 204      ( 18 May 2020 08:09 )             34.7     05-18    139  |  99  |  22  ----------------------------<  88  3.8   |  36<H>  |  0.86    Ca    9.2      18 May 2020 08:09    TPro  6.6  /  Alb  2.7<L>  /  TBili  1.0  /  DBili  x   /  AST  20  /  ALT  14  /  AlkPhos  75  05-18            Review of Systems	      Objective     Physical Examination    heart s1s2  lung dec BS  abd soft      Pertinent Lab findings & Imaging      Alec:  NO   Adequate UO     I&O's Detail    17 May 2020 07:01  -  18 May 2020 07:00  --------------------------------------------------------  IN:  Total IN: 0 mL    OUT:    Intermittent Catheterization - Urethral: 650 mL  Total OUT: 650 mL    Total NET: -650 mL      18 May 2020 07:01  -  19 May 2020 06:09  --------------------------------------------------------  IN:  Total IN: 0 mL    OUT:    Indwelling Catheter - Urethral: 1200 mL    Intermittent Catheterization - Urethral: 1000 mL  Total OUT: 2200 mL    Total NET: -2200 mL               Discussed with:     Cultures:	        Radiology

## 2020-05-20 ENCOUNTER — TRANSCRIPTION ENCOUNTER (OUTPATIENT)
Age: 85
End: 2020-05-20

## 2020-05-20 LAB — SARS-COV-2 RNA SPEC QL NAA+PROBE: SIGNIFICANT CHANGE UP

## 2020-05-20 RX ORDER — ATORVASTATIN CALCIUM 80 MG/1
1 TABLET, FILM COATED ORAL
Qty: 0 | Refills: 0 | DISCHARGE

## 2020-05-20 RX ORDER — ASPIRIN/CALCIUM CARB/MAGNESIUM 324 MG
1 TABLET ORAL
Qty: 0 | Refills: 0 | DISCHARGE

## 2020-05-20 RX ORDER — POTASSIUM CHLORIDE 20 MEQ
0 PACKET (EA) ORAL
Qty: 0 | Refills: 0 | DISCHARGE

## 2020-05-20 RX ADMIN — Medication 81 MILLIGRAM(S): at 12:11

## 2020-05-20 RX ADMIN — ATORVASTATIN CALCIUM 40 MILLIGRAM(S): 80 TABLET, FILM COATED ORAL at 20:47

## 2020-05-20 RX ADMIN — LISINOPRIL 5 MILLIGRAM(S): 2.5 TABLET ORAL at 05:15

## 2020-05-20 RX ADMIN — HEPARIN SODIUM 5000 UNIT(S): 5000 INJECTION INTRAVENOUS; SUBCUTANEOUS at 20:47

## 2020-05-20 RX ADMIN — FINASTERIDE 5 MILLIGRAM(S): 5 TABLET, FILM COATED ORAL at 12:11

## 2020-05-20 RX ADMIN — HEPARIN SODIUM 5000 UNIT(S): 5000 INJECTION INTRAVENOUS; SUBCUTANEOUS at 05:15

## 2020-05-20 RX ADMIN — SENNA PLUS 2 TABLET(S): 8.6 TABLET ORAL at 20:47

## 2020-05-20 RX ADMIN — Medication 40 MILLIGRAM(S): at 05:15

## 2020-05-20 RX ADMIN — Medication 25 MILLIGRAM(S): at 05:15

## 2020-05-20 RX ADMIN — HEPARIN SODIUM 5000 UNIT(S): 5000 INJECTION INTRAVENOUS; SUBCUTANEOUS at 12:10

## 2020-05-20 NOTE — DISCHARGE NOTE PROVIDER - HOSPITAL COURSE
89 y/o male with PMHx Anxiety, Atrial fibrillation, CAD, High cholesterol, HTN BIBA from [facility] due to right leg pain. Pt reports fall 4 days ago. Pt reports he is unable to ambulate due to pain. Pt also reports new knee pain, no recent injury. Pt notes he uses a walker at home but unable to bear weight. pt describes pain as aching, non-radiating, and currently 3/10 at rest. pt denies recent fall, fever, chills, cp, SOB, numbness/weakness, cough, HA, or any other complaints.    pt found to have hip fracture, no surgery as per ortho, conservative  maanagement     d planning back to assisted living

## 2020-05-20 NOTE — DISCHARGE NOTE PROVIDER - CARE PROVIDER_API CALL
TRACIE REED  Internal Medicine  [address], [address]  [place] [address]  Phone: [phone]  Fax: [phone]  Follow Up Time:

## 2020-05-20 NOTE — DISCHARGE NOTE PROVIDER - NSDCMRMEDTOKEN_GEN_ALL_CORE_FT
acetaminophen 500 mg oral tablet: 500 milligram(s) orally 2 times a day, As Needed  ALPRAZolam 0.25 mg oral tablet: 0.5 tab(s) orally once a day, As Needed  Ambien CR 6.25 mg oral tablet, extended release: 1 tab(s) orally once a day (at bedtime), As Needed  finasteride 5 mg oral tablet: 1 tab(s) orally once a day  furosemide 40 mg oral tablet: 1 tab(s) orally once a day  metoprolol succinate 25 mg oral tablet, extended release: 1 tab(s) orally once a day  Multiple Vitamins oral tablet: 1 tab(s) orally once a day  nitroglycerin 0.4 mg sublingual tablet: 1 tab(s) sublingual every 5 minutes, As Needed  ramipril 5 mg oral tablet: orally once a day  Vitamin D3 1000 intl units (25 mcg) oral tablet: orally once a day

## 2020-05-20 NOTE — PROGRESS NOTE ADULT - ASSESSMENT
89M with known AF not on AC with h/o bleeding, HTN, HLD, CAD s/p TAVR admitted with inability to ambulate and severe RIGHT leg pain s/p fall 4 days PTA. Found with RIGHT hip fx. No planned surgery on the hip.    < from: TTE Echo Complete w/o contrast w/ Doppler (05.17.20 @ 10:15) >    Conclusion:   1. This a very technically limited study.  2. There is normal left ventricular size and left ventricular systolic function with an ejection fraction of 65-70%.  3. There is mild left ventricular hypertrophy with asymmetric septal hypertrophy.  4. There is significant mitral annular calcification with trivial mitral regurgitation. The mitral valvearea was 2.1 sq cm based on a mitral valve pressure half time of 90 ms.  5. There is diastolic dysfunction.  6. There is tricuspid annular calcification with mild tricuspid regurgitation and pacemaker wires visualized in the right atrium and right ventricle..  7. There is biatrial enlargement noted.  8. There is a bioprosthetic aortic valve noted. The mean gradient is 14.2 mmHg and the peak aortic valve gradient is 29.3 mmHg  9. There is no significant pericardial effusion.    < end of copied text >      CAD- asymptomatic, c/w metoprolol/lisinopril, lipitor and baby aspirin daily  TAVR- asymptomatic with ok valve gradients  HTN- controlled on above meds  A fib- rate controlled not on AC given history of bleeding and falls.  DVT prophylaxis  PT    No further inpatient cardiac work up  Pt to follow up with Dr. Lowery as outpatient in 4 weeks.  [phone]

## 2020-05-20 NOTE — PROGRESS NOTE ADULT - SUBJECTIVE AND OBJECTIVE BOX
Date/Time Patient Seen:  		  Referring MD:   Data Reviewed	       Patient is a 89y old  Male who presents with a chief complaint of fall (19 May 2020 16:36)      Subjective/HPI     PAST MEDICAL & SURGICAL HISTORY:  HTN (hypertension)  Atrial fibrillation  CAD (coronary artery disease)  High cholesterol  Anxiety  No significant past surgical history        Medication list         MEDICATIONS  (STANDING):  aspirin  chewable 81 milliGRAM(s) Oral daily  atorvastatin 40 milliGRAM(s) Oral at bedtime  finasteride 5 milliGRAM(s) Oral daily  furosemide    Tablet 40 milliGRAM(s) Oral daily  heparin   Injectable 5000 Unit(s) SubCutaneous every 8 hours  lisinopril 5 milliGRAM(s) Oral daily  metoprolol succinate ER 25 milliGRAM(s) Oral daily  senna 2 Tablet(s) Oral at bedtime    MEDICATIONS  (PRN):  acetaminophen   Tablet .. 650 milliGRAM(s) Oral every 6 hours PRN Temp greater or equal to 38C (100.4F), Mild Pain (1 - 3)  ALPRAZolam 0.25 milliGRAM(s) Oral daily PRN anxiety  morphine  - Injectable 2 milliGRAM(s) IV Push every 6 hours PRN Severe Pain (7 - 10)  oxycodone    5 mG/acetaminophen 325 mG 1 Tablet(s) Oral every 6 hours PRN Moderate Pain (4 - 6)  zolpidem 5 milliGRAM(s) Oral at bedtime PRN Insomnia         Vitals log        ICU Vital Signs Last 24 Hrs  T(C): 36.6 (20 May 2020 05:06), Max: 37.1 (19 May 2020 20:48)  T(F): 97.8 (20 May 2020 05:06), Max: 98.7 (19 May 2020 20:48)  HR: 73 (20 May 2020 05:06) (67 - 83)  BP: 123/76 (20 May 2020 05:06) (93/61 - 123/76)  BP(mean): --  ABP: --  ABP(mean): --  RR: 17 (20 May 2020 05:06) (17 - 18)  SpO2: 95% (20 May 2020 05:06) (92% - 95%)           Input and Output:  I&O's Detail    18 May 2020 07:01  -  19 May 2020 07:00  --------------------------------------------------------  IN:  Total IN: 0 mL    OUT:    Indwelling Catheter - Urethral: 1650 mL    Intermittent Catheterization - Urethral: 1000 mL  Total OUT: 2650 mL    Total NET: -2650 mL      19 May 2020 07:01  -  20 May 2020 06:14  --------------------------------------------------------  IN:  Total IN: 0 mL    OUT:    Indwelling Catheter - Urethral: 1275 mL  Total OUT: 1275 mL    Total NET: -1275 mL          Lab Data                        11.4   9.75  )-----------( 269      ( 19 May 2020 09:04 )             37.8     05-19    138  |  99  |  22  ----------------------------<  93  4.1   |  34<H>  |  0.92    Ca    9.4      19 May 2020 09:04    TPro  7.1  /  Alb  2.9<L>  /  TBili  1.0  /  DBili  x   /  AST  20  /  ALT  16  /  AlkPhos  81  05-19            Review of Systems	      Objective     Physical Examination    heart s1s2  lung dec BS  abd soft      Pertinent Lab findings & Imaging      Alec:  NO   Adequate UO     I&O's Detail    18 May 2020 07:01  -  19 May 2020 07:00  --------------------------------------------------------  IN:  Total IN: 0 mL    OUT:    Indwelling Catheter - Urethral: 1650 mL    Intermittent Catheterization - Urethral: 1000 mL  Total OUT: 2650 mL    Total NET: -2650 mL      19 May 2020 07:01  -  20 May 2020 06:14  --------------------------------------------------------  IN:  Total IN: 0 mL    OUT:    Indwelling Catheter - Urethral: 1275 mL  Total OUT: 1275 mL    Total NET: -1275 mL               Discussed with:     Cultures:	        Radiology

## 2020-05-20 NOTE — PROGRESS NOTE ADULT - PROBLEM SELECTOR PLAN 1
CM following - DC planning - not optimized for [facility] - as per CM notes  GOC documented - DNR DNI - MOLST filled out -   no surgical intervention planned as per Ortho - PT - and Rehab  right hip fx - OP - OA - Fall  cvs rx regimen  pain assessment  CXR clear   TTE pending - HFpEF and valv heart disease -   dvt p at present  s/p o2 support - I hanh -   labs and imaging reviewed -   discussed GOC with the patient -.

## 2020-05-20 NOTE — PROGRESS NOTE ADULT - SUBJECTIVE AND OBJECTIVE BOX
Patient is a 89y old  Male who presents with a chief complaint of fall (20 May 2020 10:26)      INTERVAL HPI/OVERNIGHT EVENTS:  T(C): 36.4 (05-20-20 @ 14:01), Max: 37.1 (05-19-20 @ 20:48)  HR: 70 (05-20-20 @ 14:01) (67 - 73)  BP: 117/77 (05-20-20 @ 14:01) (103/61 - 123/76)  RR: 16 (05-20-20 @ 14:01) (16 - 18)  SpO2: 95% (05-20-20 @ 14:01) (95% - 95%)  Wt(kg): --  I&O's Summary    19 May 2020 07:01  -  20 May 2020 07:00  --------------------------------------------------------  IN: 0 mL / OUT: 1275 mL / NET: -1275 mL    20 May 2020 07:01  -  20 May 2020 17:56  --------------------------------------------------------  IN: 0 mL / OUT: 300 mL / NET: -300 mL        LABS:                        11.4   9.75  )-----------( 269      ( 19 May 2020 09:04 )             37.8     05-19    138  |  99  |  22  ----------------------------<  93  4.1   |  34<H>  |  0.92    Ca    9.4      19 May 2020 09:04    TPro  7.1  /  Alb  2.9<L>  /  TBili  1.0  /  DBili  x   /  AST  20  /  ALT  16  /  AlkPhos  81  05-19        CAPILLARY BLOOD GLUCOSE                MEDICATIONS  (STANDING):  aspirin  chewable 81 milliGRAM(s) Oral daily  atorvastatin 40 milliGRAM(s) Oral at bedtime  finasteride 5 milliGRAM(s) Oral daily  furosemide    Tablet 40 milliGRAM(s) Oral daily  heparin   Injectable 5000 Unit(s) SubCutaneous every 8 hours  lisinopril 5 milliGRAM(s) Oral daily  metoprolol succinate ER 25 milliGRAM(s) Oral daily  senna 2 Tablet(s) Oral at bedtime    MEDICATIONS  (PRN):  acetaminophen   Tablet .. 650 milliGRAM(s) Oral every 6 hours PRN Temp greater or equal to 38C (100.4F), Mild Pain (1 - 3)  ALPRAZolam 0.25 milliGRAM(s) Oral daily PRN anxiety  morphine  - Injectable 2 milliGRAM(s) IV Push every 6 hours PRN Severe Pain (7 - 10)  oxycodone    5 mG/acetaminophen 325 mG 1 Tablet(s) Oral every 6 hours PRN Moderate Pain (4 - 6)  zolpidem 5 milliGRAM(s) Oral at bedtime PRN Insomnia          PHYSICAL EXAM:  GENERAL: NAD, well-groomed, well-developed  HEAD:  Atraumatic, Normocephalic  CHEST/LUNG: Clear to percussion bilaterally; No rales, rhonchi, wheezing, or rubs  HEART: Regular rate and rhythm; No murmurs, rubs, or gallops  ABDOMEN: Soft, Nontender, Nondistended; Bowel sounds present  EXTREMITIES:  2+ Peripheral Pulses, No clubbing, cyanosis, or edema  LYMPH: No lymphadenopathy noted  SKIN: No rashes or lesions    Care Discussed with Consultants/Other Providers [ ] YES  [ ] NO

## 2020-05-20 NOTE — DISCHARGE NOTE PROVIDER - NSDCCPCAREPLAN_GEN_ALL_CORE_FT
PRINCIPAL DISCHARGE DIAGNOSIS  Diagnosis: Closed displaced fracture of greater trochanter of right femur, initial encounter  Assessment and Plan of Treatment:

## 2020-05-20 NOTE — PROGRESS NOTE ADULT - SUBJECTIVE AND OBJECTIVE BOX
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 Reports mild to moderate leg discomfort. Absence of any chest discomfort or shortness of breath.    HPI:  89 y/o male with PMHx Anxiety, Atrial fibrillation, CAD, High cholesterol, HTN BIBA from [facility] due to right leg pain. Pt reports fall 4 days ago. Pt reports he is unable to ambulate due to pain. Pt also reports new knee pain, no recent injury. Pt notes he uses a walker at home but unable to bear weight. pt describes pain as aching, non-radiating, and currently 3/10 at rest. pt denies recent fall, fever, chills, cp, SOB, numbness/weakness, cough, HA, or any other complaints. (15 May 2020 22:28)    REVIEW OF SYSTEMS:    All other review of systems is negative unless indicated above    MEDICATIONS  (STANDING):  aspirin  chewable 81 milliGRAM(s) Oral daily  atorvastatin 40 milliGRAM(s) Oral at bedtime  finasteride 5 milliGRAM(s) Oral daily  furosemide    Tablet 40 milliGRAM(s) Oral daily  heparin   Injectable 5000 Unit(s) SubCutaneous every 8 hours  lisinopril 5 milliGRAM(s) Oral daily  metoprolol succinate ER 25 milliGRAM(s) Oral daily  senna 2 Tablet(s) Oral at bedtime    MEDICATIONS  (PRN):  acetaminophen   Tablet .. 650 milliGRAM(s) Oral every 6 hours PRN Temp greater or equal to 38C (100.4F), Mild Pain (1 - 3)  ALPRAZolam 0.25 milliGRAM(s) Oral daily PRN anxiety  morphine  - Injectable 2 milliGRAM(s) IV Push every 6 hours PRN Severe Pain (7 - 10)  oxycodone    5 mG/acetaminophen 325 mG 1 Tablet(s) Oral every 6 hours PRN Moderate Pain (4 - 6)  zolpidem 5 milliGRAM(s) Oral at bedtime PRN Insomnia      ALLERGIES: IV Contrast dye (Nausea)  No Known Drug Allergies      FAMILY HISTORY:  No pertinent family history in first degree relatives      PHYSICAL EXAMINATION:  -----------------------------  T(C): 36.4 (05-20-20 @ 14:01), Max: 37.1 (05-19-20 @ 20:48)  HR: 70 (05-20-20 @ 14:01) (67 - 73)  BP: 117/77 (05-20-20 @ 14:01) (103/61 - 123/76)  RR: 16 (05-20-20 @ 14:01) (16 - 18)  SpO2: 95% (05-20-20 @ 14:01) (95% - 95%)  Wt(kg): --    05-19 @ 07:01  -  05-20 @ 07:00  --------------------------------------------------------  IN:  Total IN: 0 mL    OUT:    Indwelling Catheter - Urethral: 1275 mL  Total OUT: 1275 mL    Total NET: -1275 mL      05-20 @ 07:01  -  05-20 @ 16:24  --------------------------------------------------------  IN:  Total IN: 0 mL    OUT:    Indwelling Catheter - Urethral: 300 mL  Total OUT: 300 mL    Total NET: -300 mL    PHYSICAL EXAM:    Constitutional: NAD, awake and alert, well-developed  Eyes:  EOMI,  Pupils round, No oral cyanosis.  HEENT: No exudate or erythema  Pulmonary: Non-labored, breath sounds are clear bilaterally, No wheezing, rales or rhonchi  Cardiovascular: Regular, S1 and S2, 2/6 systolic murmurs  Gastrointestinal: Bowel Sounds present, soft, nontender.   Ext: No significant LE edema  Neurological: Alert, no gross focal motor deficits  Skin: No rashes.  Psych:  Mood & affect appropriate    LABS: All Labs Reviewed:                   LABS:   --------  05-19    138  |  99  |  22  ----------------------------<  93  4.1   |  34<H>  |  0.92    Ca    9.4      19 May 2020 09:04    TPro  7.1  /  Alb  2.9<L>  /  TBili  1.0  /  DBili  x   /  AST  20  /  ALT  16  /  AlkPhos  81  05-19                         11.4   9.75  )-----------( 269      ( 19 May 2020 09:04 )             37.8

## 2020-05-21 ENCOUNTER — TRANSCRIPTION ENCOUNTER (OUTPATIENT)
Age: 85
End: 2020-05-21

## 2020-05-21 RX ADMIN — ATORVASTATIN CALCIUM 40 MILLIGRAM(S): 80 TABLET, FILM COATED ORAL at 21:40

## 2020-05-21 RX ADMIN — HEPARIN SODIUM 5000 UNIT(S): 5000 INJECTION INTRAVENOUS; SUBCUTANEOUS at 12:39

## 2020-05-21 RX ADMIN — SENNA PLUS 2 TABLET(S): 8.6 TABLET ORAL at 21:40

## 2020-05-21 RX ADMIN — Medication 25 MILLIGRAM(S): at 06:10

## 2020-05-21 RX ADMIN — LISINOPRIL 5 MILLIGRAM(S): 2.5 TABLET ORAL at 06:10

## 2020-05-21 RX ADMIN — Medication 81 MILLIGRAM(S): at 10:49

## 2020-05-21 RX ADMIN — HEPARIN SODIUM 5000 UNIT(S): 5000 INJECTION INTRAVENOUS; SUBCUTANEOUS at 06:10

## 2020-05-21 RX ADMIN — HEPARIN SODIUM 5000 UNIT(S): 5000 INJECTION INTRAVENOUS; SUBCUTANEOUS at 21:40

## 2020-05-21 RX ADMIN — Medication 40 MILLIGRAM(S): at 06:11

## 2020-05-21 RX ADMIN — FINASTERIDE 5 MILLIGRAM(S): 5 TABLET, FILM COATED ORAL at 10:49

## 2020-05-21 NOTE — PROGRESS NOTE ADULT - ASSESSMENT
Problem/Plan - 1:  ·  Problem: Closed displaced fracture of greater trochanter of right femur, initial encounter.  Plan: OR as per ortho   pain control  no surgery as per ortho   will monitor.     Problem/Plan - 2:  ·  Problem: Atrial fibrillation.  Plan: telemonitor   cards fu   no AC sec to hx of GIB     Problem/Plan - 3:  ·  Problem: CAD (coronary artery disease).  Plan: telemonitor   cw home meds  cardiology fu appreciated     PT and dc planning to [facility] in am

## 2020-05-21 NOTE — PROGRESS NOTE ADULT - SUBJECTIVE AND OBJECTIVE BOX
Patient is a 89y old  Male who presents with a chief complaint of fall (20 May 2020 10:26)      INTERVAL HPI/OVERNIGHT EVENTS:  T(C): 36.4 (05-21-20 @ 14:03), Max: 36.4 (05-20-20 @ 21:16)  HR: 76 (05-21-20 @ 14:03) (67 - 76)  BP: 113/61 (05-21-20 @ 14:03) (99/65 - 113/61)  RR: 17 (05-21-20 @ 14:03) (16 - 17)  SpO2: 96% (05-21-20 @ 14:03) (93% - 96%)  Wt(kg): --  I&O's Summary    20 May 2020 07:01  -  21 May 2020 07:00  --------------------------------------------------------  IN: 0 mL / OUT: 1250 mL / NET: -1250 mL    21 May 2020 07:01  -  21 May 2020 17:53  --------------------------------------------------------  IN: 0 mL / OUT: 400 mL / NET: -400 mL        LABS:              CAPILLARY BLOOD GLUCOSE                MEDICATIONS  (STANDING):  aspirin  chewable 81 milliGRAM(s) Oral daily  atorvastatin 40 milliGRAM(s) Oral at bedtime  finasteride 5 milliGRAM(s) Oral daily  furosemide    Tablet 40 milliGRAM(s) Oral daily  heparin   Injectable 5000 Unit(s) SubCutaneous every 8 hours  lisinopril 5 milliGRAM(s) Oral daily  metoprolol succinate ER 25 milliGRAM(s) Oral daily  senna 2 Tablet(s) Oral at bedtime    MEDICATIONS  (PRN):  acetaminophen   Tablet .. 650 milliGRAM(s) Oral every 6 hours PRN Temp greater or equal to 38C (100.4F), Mild Pain (1 - 3)  ALPRAZolam 0.25 milliGRAM(s) Oral daily PRN anxiety  morphine  - Injectable 2 milliGRAM(s) IV Push every 6 hours PRN Severe Pain (7 - 10)  oxycodone    5 mG/acetaminophen 325 mG 1 Tablet(s) Oral every 6 hours PRN Moderate Pain (4 - 6)  zolpidem 5 milliGRAM(s) Oral at bedtime PRN Insomnia          PHYSICAL EXAM:  GENERAL: frail  CHEST/LUNG: Clear to percussion bilaterally; No rales, rhonchi, wheezing, or rubs  HEART: Regular rate and rhythm; No murmurs, rubs, or gallops  ABDOMEN: Soft, Nontender, Nondistended; Bowel sounds present  EXTREMITIES:  2+ Peripheral Pulses, No clubbing, cyanosis, or edema  LYMPH: No lymphadenopathy noted  SKIN: No rashes or lesions    Care Discussed with Consultants/Other Providers [ ] YES  [ ] NO

## 2020-05-21 NOTE — PROGRESS NOTE ADULT - SUBJECTIVE AND OBJECTIVE BOX
Date/Time Patient Seen:  		  Referring MD:   Data Reviewed	       Patient is a 89y old  Male who presents with a chief complaint of fall (20 May 2020 10:26)      Subjective/HPI     PAST MEDICAL & SURGICAL HISTORY:  HTN (hypertension)  Atrial fibrillation  CAD (coronary artery disease)  High cholesterol  Anxiety  No significant past surgical history        Medication list         MEDICATIONS  (STANDING):  aspirin  chewable 81 milliGRAM(s) Oral daily  atorvastatin 40 milliGRAM(s) Oral at bedtime  finasteride 5 milliGRAM(s) Oral daily  furosemide    Tablet 40 milliGRAM(s) Oral daily  heparin   Injectable 5000 Unit(s) SubCutaneous every 8 hours  lisinopril 5 milliGRAM(s) Oral daily  metoprolol succinate ER 25 milliGRAM(s) Oral daily  senna 2 Tablet(s) Oral at bedtime    MEDICATIONS  (PRN):  acetaminophen   Tablet .. 650 milliGRAM(s) Oral every 6 hours PRN Temp greater or equal to 38C (100.4F), Mild Pain (1 - 3)  ALPRAZolam 0.25 milliGRAM(s) Oral daily PRN anxiety  morphine  - Injectable 2 milliGRAM(s) IV Push every 6 hours PRN Severe Pain (7 - 10)  oxycodone    5 mG/acetaminophen 325 mG 1 Tablet(s) Oral every 6 hours PRN Moderate Pain (4 - 6)  zolpidem 5 milliGRAM(s) Oral at bedtime PRN Insomnia         Vitals log        ICU Vital Signs Last 24 Hrs  T(C): 36.4 (21 May 2020 05:07), Max: 36.4 (20 May 2020 14:01)  T(F): 97.5 (21 May 2020 05:07), Max: 97.6 (20 May 2020 14:01)  HR: 69 (21 May 2020 05:07) (67 - 70)  BP: 110/65 (21 May 2020 05:07) (99/65 - 117/77)  BP(mean): --  ABP: --  ABP(mean): --  RR: 16 (21 May 2020 05:07) (16 - 17)  SpO2: 95% (21 May 2020 05:07) (93% - 95%)           Input and Output:  I&O's Detail    19 May 2020 07:01  -  20 May 2020 07:00  --------------------------------------------------------  IN:  Total IN: 0 mL    OUT:    Indwelling Catheter - Urethral: 1275 mL  Total OUT: 1275 mL    Total NET: -1275 mL      20 May 2020 07:01  -  21 May 2020 06:04  --------------------------------------------------------  IN:  Total IN: 0 mL    OUT:    Indwelling Catheter - Urethral: 300 mL  Total OUT: 300 mL    Total NET: -300 mL          Lab Data                        11.4   9.75  )-----------( 269      ( 19 May 2020 09:04 )             37.8     05-19    138  |  99  |  22  ----------------------------<  93  4.1   |  34<H>  |  0.92    Ca    9.4      19 May 2020 09:04    TPro  7.1  /  Alb  2.9<L>  /  TBili  1.0  /  DBili  x   /  AST  20  /  ALT  16  /  AlkPhos  81  05-19            Review of Systems	      Objective     Physical Examination    heart s1s2  lung dec BS  abd soft      Pertinent Lab findings & Imaging      Alec:  NO   Adequate UO     I&O's Detail    19 May 2020 07:01  -  20 May 2020 07:00  --------------------------------------------------------  IN:  Total IN: 0 mL    OUT:    Indwelling Catheter - Urethral: 1275 mL  Total OUT: 1275 mL    Total NET: -1275 mL      20 May 2020 07:01  -  21 May 2020 06:04  --------------------------------------------------------  IN:  Total IN: 0 mL    OUT:    Indwelling Catheter - Urethral: 300 mL  Total OUT: 300 mL    Total NET: -300 mL               Discussed with:     Cultures:	        Radiology

## 2020-05-21 NOTE — DISCHARGE NOTE NURSING/CASE MANAGEMENT/SOCIAL WORK - PATIENT PORTAL LINK FT
You can access the FollowMyHealth Patient Portal offered by [facility] by registering at the following website: [email or web address]. By joining [facility]’s FollowMyHealth portal, you will also be able to view your health information using other applications (apps) compatible with our system.

## 2020-05-21 NOTE — PROGRESS NOTE ADULT - SUBJECTIVE AND OBJECTIVE BOX
[facility] Cardiology    CHIEF COMPLAINT: Patient is a 89y old  Male who presents with a chief complaint of fall (20 May 2020 10:26)      Follow Up: [ ] Chest Pain      [ ] Dyspnea     [ ] Palpitations    [ ] Atrial Fibrillation     [ ] Ventricular Dysrhythmia    [ ] Abnormal EKG                      [ ] Abnormal Cardiac Enzymes     [ ] Valvular Disease    HPI:  89 y/o male with PMHx Anxiety, Atrial fibrillation, CAD, High cholesterol, HTN BIBA from [facility] due to right leg pain. Pt reports fall 4 days ago. Pt reports he is unable to ambulate due to pain. Pt also reports new knee pain, no recent injury. Pt notes he uses a walker at home but unable to bear weight. pt describes pain as aching, non-radiating, and currently 3/10 at rest. pt denies recent fall, fever, chills, cp, SOB, numbness/weakness, cough, HA, or any other complaints. (15 May 2020 22:28)    PAST MEDICAL & SURGICAL HISTORY:  HTN (hypertension)  Atrial fibrillation  CAD (coronary artery disease)  High cholesterol  Anxiety  No significant past surgical history    MEDICATIONS  (STANDING):  aspirin  chewable 81 milliGRAM(s) Oral daily  atorvastatin 40 milliGRAM(s) Oral at bedtime  finasteride 5 milliGRAM(s) Oral daily  furosemide    Tablet 40 milliGRAM(s) Oral daily  heparin   Injectable 5000 Unit(s) SubCutaneous every 8 hours  lisinopril 5 milliGRAM(s) Oral daily  metoprolol succinate ER 25 milliGRAM(s) Oral daily  senna 2 Tablet(s) Oral at bedtime    MEDICATIONS  (PRN):  acetaminophen   Tablet .. 650 milliGRAM(s) Oral every 6 hours PRN Temp greater or equal to 38C (100.4F), Mild Pain (1 - 3)  ALPRAZolam 0.25 milliGRAM(s) Oral daily PRN anxiety  morphine  - Injectable 2 milliGRAM(s) IV Push every 6 hours PRN Severe Pain (7 - 10)  oxycodone    5 mG/acetaminophen 325 mG 1 Tablet(s) Oral every 6 hours PRN Moderate Pain (4 - 6)  zolpidem 5 milliGRAM(s) Oral at bedtime PRN Insomnia    Allergies    IV Contrast dye (Nausea)  No Known Drug Allergies    Intolerances        REVIEW OF SYSTEMS:    CONSTITUTIONAL: No weakness, fevers or chills.   EYES/ENT: No visual changes;    NECK: No pain or stiffness  RESPIRATORY: No cough, wheezing, No shortness of breath  CARDIOVASCULAR: No chest pain or palpitations  GASTROINTESTINAL: No abdominal pain, or hematochezia.  GENITOURINARY: No dysuria orhematuria  NEUROLOGICAL: No numbness or weakness  SKIN: No itching, burning, rashes  All other review of systems is negative unless indicated above    Vital Signs Last 24 Hrs  T(C): 36.4 (21 May 2020 05:07), Max: 36.4 (20 May 2020 14:01)  T(F): 97.5 (21 May 2020 05:07), Max: 97.6 (20 May 2020 14:01)  HR: 69 (21 May 2020 05:07) (67 - 70)  BP: 110/65 (21 May 2020 05:07) (99/65 - 117/77)  BP(mean): --  RR: 16 (21 May 2020 05:07) (16 - 17)  SpO2: 95% (21 May 2020 05:07) (93% - 95%)  I&O's Summary    20 May 2020 07:01  -  21 May 2020 07:00  --------------------------------------------------------  IN: 0 mL / OUT: 1250 mL / NET: -1250 mL        PHYSICAL EXAM:  Constitutional: NAD  Neurological: Alert, no focal deficits  HEENT: no JVD, EOMI  Cardiovascular: Regular, S1 and S2, no murmur  Pulmonary: breath sounds bilaterally  Gastrointestinal: Bowel Sounds present, soft, nontender  EXT:  no peripheral edema  Skin: No rashes.  Psych:  Mood calm  LABS: All Labs Reviewed:      · Assessment		  89M with known AF not on AC with h/o bleeding, HTN, HLD, CAD s/p TAVR admitted with inability to ambulate and severe RIGHT leg pain s/p fall 4 days PTA. Found with RIGHT hip fx. No planned surgery on the hip.    < from: TTE Echo Complete w/o contrast w/ Doppler (05.17.20 @ 10:15) >    Conclusion:   1. This a very technically limited study.  2. There is normal left ventricular size and left ventricular systolic function with an ejection fraction of 65-70%.  3. There is mild left ventricular hypertrophy with asymmetric septal hypertrophy.  4. There is significant mitral annular calcification with trivial mitral regurgitation. The mitral valvearea was 2.1 sq cm based on a mitral valve pressure half time of 90 ms.  5. There is diastolic dysfunction.  6. There is tricuspid annular calcification with mild tricuspid regurgitation and pacemaker wires visualized in the right atrium and right ventricle..  7. There is biatrial enlargement noted.  8. There is a bioprosthetic aortic valve noted. The mean gradient is 14.2 mmHg and the peak aortic valve gradient is 29.3 mmHg  9. There is no significant pericardial effusion.    < end of copied text >    CAD- asymptomatic, c/w metoprolol/lisinopril, lipitor and baby aspirin daily  TAVR- asymptomatic with ok valve gradients  HTN- controlled on above meds  A fib- rate controlled not on AC given history of bleeding and falls.  DVT prophylaxis  PT    No further inpatient cardiac work up  Pt to follow up with Dr. Lowery as outpatient in 4 weeks.  [phone]

## 2020-05-21 NOTE — PROGRESS NOTE ADULT - PROBLEM SELECTOR PLAN 1
May 20 and 15 - neg for covid -   CM following - DC planning - not optimized for detention - as per CM notes  GOC documented - DNR DNI - MOLST filled out -   no surgical intervention planned as per Ortho - PT - and Rehab  right hip fx - OP - OA - Fall  cvs rx regimen  pain assessment  CXR clear   TTE pending - HFpEF and valv heart disease -   dvt p at present  s/p o2 support - I hanh -   labs and imaging reviewed -   discussed GOC with the patient -.

## 2020-05-22 VITALS
HEART RATE: 95 BPM | RESPIRATION RATE: 18 BRPM | OXYGEN SATURATION: 92 % | DIASTOLIC BLOOD PRESSURE: 73 MMHG | TEMPERATURE: 98 F | SYSTOLIC BLOOD PRESSURE: 115 MMHG

## 2020-05-22 LAB
ALBUMIN SERPL ELPH-MCNC: 2.6 G/DL — LOW (ref 3.3–5)
ALP SERPL-CCNC: 86 U/L — SIGNIFICANT CHANGE UP (ref 40–120)
ALT FLD-CCNC: 24 U/L — SIGNIFICANT CHANGE UP (ref 12–78)
ANION GAP SERPL CALC-SCNC: 5 MMOL/L — SIGNIFICANT CHANGE UP (ref 5–17)
AST SERPL-CCNC: 29 U/L — SIGNIFICANT CHANGE UP (ref 15–37)
BILIRUB SERPL-MCNC: 0.7 MG/DL — SIGNIFICANT CHANGE UP (ref 0.2–1.2)
BUN SERPL-MCNC: 26 MG/DL — HIGH (ref 7–23)
CALCIUM SERPL-MCNC: 9.4 MG/DL — SIGNIFICANT CHANGE UP (ref 8.5–10.1)
CHLORIDE SERPL-SCNC: 100 MMOL/L — SIGNIFICANT CHANGE UP (ref 96–108)
CO2 SERPL-SCNC: 32 MMOL/L — HIGH (ref 22–31)
CREAT SERPL-MCNC: 1 MG/DL — SIGNIFICANT CHANGE UP (ref 0.5–1.3)
GLUCOSE SERPL-MCNC: 82 MG/DL — SIGNIFICANT CHANGE UP (ref 70–99)
HCT VFR BLD CALC: 34.5 % — LOW (ref 39–50)
HGB BLD-MCNC: 10.4 G/DL — LOW (ref 13–17)
MCHC RBC-ENTMCNC: 28.3 PG — SIGNIFICANT CHANGE UP (ref 27–34)
MCHC RBC-ENTMCNC: 30.1 GM/DL — LOW (ref 32–36)
MCV RBC AUTO: 93.8 FL — SIGNIFICANT CHANGE UP (ref 80–100)
NRBC # BLD: 0 /100 WBCS — SIGNIFICANT CHANGE UP (ref 0–0)
PLATELET # BLD AUTO: 281 K/UL — SIGNIFICANT CHANGE UP (ref 150–400)
POTASSIUM SERPL-MCNC: 4.2 MMOL/L — SIGNIFICANT CHANGE UP (ref 3.5–5.3)
POTASSIUM SERPL-SCNC: 4.2 MMOL/L — SIGNIFICANT CHANGE UP (ref 3.5–5.3)
PROT SERPL-MCNC: 6.6 G/DL — SIGNIFICANT CHANGE UP (ref 6–8.3)
RBC # BLD: 3.68 M/UL — LOW (ref 4.2–5.8)
RBC # FLD: 15.1 % — HIGH (ref 10.3–14.5)
SODIUM SERPL-SCNC: 137 MMOL/L — SIGNIFICANT CHANGE UP (ref 135–145)
WBC # BLD: 7.95 K/UL — SIGNIFICANT CHANGE UP (ref 3.8–10.5)
WBC # FLD AUTO: 7.95 K/UL — SIGNIFICANT CHANGE UP (ref 3.8–10.5)

## 2020-05-22 PROCEDURE — 99284 EMERGENCY DEPT VISIT MOD MDM: CPT

## 2020-05-22 RX ADMIN — LISINOPRIL 5 MILLIGRAM(S): 2.5 TABLET ORAL at 05:52

## 2020-05-22 RX ADMIN — HEPARIN SODIUM 5000 UNIT(S): 5000 INJECTION INTRAVENOUS; SUBCUTANEOUS at 05:52

## 2020-05-22 RX ADMIN — Medication 25 MILLIGRAM(S): at 05:52

## 2020-05-22 RX ADMIN — Medication 40 MILLIGRAM(S): at 05:52

## 2020-05-22 NOTE — PROGRESS NOTE ADULT - SUBJECTIVE AND OBJECTIVE BOX
[facility] Cardiology    CHIEF COMPLAINT: Patient is a 89y old  Male who presents with a chief complaint of fall (20 May 2020 10:26)      Follow Up: [ ] Chest Pain      [ ] Dyspnea     [ ] Palpitations    [ ] Atrial Fibrillation     [ ] Ventricular Dysrhythmia    [ ] Abnormal EKG                      [ ] Abnormal Cardiac Enzymes     [ ] Valvular Disease    HPI:  89 y/o male with PMHx Anxiety, Atrial fibrillation, CAD, High cholesterol, HTN BIBA from [facility] due to right leg pain. Pt reports fall 4 days ago. Pt reports he is unable to ambulate due to pain. Pt also reports new knee pain, no recent injury. Pt notes he uses a walker at home but unable to bear weight. pt describes pain as aching, non-radiating, and currently 3/10 at rest. pt denies recent fall, fever, chills, cp, SOB, numbness/weakness, cough, HA, or any other complaints. (15 May 2020 22:28)    PAST MEDICAL & SURGICAL HISTORY:  HTN (hypertension)  Atrial fibrillation  CAD (coronary artery disease)  High cholesterol  Anxiety  No significant past surgical history    MEDICATIONS  (STANDING):  aspirin  chewable 81 milliGRAM(s) Oral daily  atorvastatin 40 milliGRAM(s) Oral at bedtime  finasteride 5 milliGRAM(s) Oral daily  furosemide    Tablet 40 milliGRAM(s) Oral daily  heparin   Injectable 5000 Unit(s) SubCutaneous every 8 hours  lisinopril 5 milliGRAM(s) Oral daily  metoprolol succinate ER 25 milliGRAM(s) Oral daily  senna 2 Tablet(s) Oral at bedtime    MEDICATIONS  (PRN):  acetaminophen   Tablet .. 650 milliGRAM(s) Oral every 6 hours PRN Temp greater or equal to 38C (100.4F), Mild Pain (1 - 3)  ALPRAZolam 0.25 milliGRAM(s) Oral daily PRN anxiety  morphine  - Injectable 2 milliGRAM(s) IV Push every 6 hours PRN Severe Pain (7 - 10)  oxycodone    5 mG/acetaminophen 325 mG 1 Tablet(s) Oral every 6 hours PRN Moderate Pain (4 - 6)  zolpidem 5 milliGRAM(s) Oral at bedtime PRN Insomnia    Allergies    IV Contrast dye (Nausea)  No Known Drug Allergies    Intolerances        REVIEW OF SYSTEMS:    CONSTITUTIONAL: No weakness, fevers or chills.   EYES/ENT: No visual changes;    NECK: No pain or stiffness  RESPIRATORY: No cough, wheezing, No shortness of breath  CARDIOVASCULAR: No chest pain or palpitations  GASTROINTESTINAL: No abdominal pain, or hematochezia.  GENITOURINARY: No dysuria orhematuria  NEUROLOGICAL: No numbness or weakness  SKIN: No itching, burning, rashes  All other review of systems is negative unless indicated above    Vital Signs Last 24 Hrs  T(C): 36.6 (22 May 2020 05:13), Max: 36.6 (21 May 2020 20:58)  T(F): 97.9 (22 May 2020 05:13), Max: 97.9 (22 May 2020 05:13)  HR: 95 (22 May 2020 05:13) (62 - 95)  BP: 115/73 (22 May 2020 05:13) (97/59 - 115/73)  BP(mean): --  RR: 18 (22 May 2020 05:13) (16 - 18)  SpO2: 92% (22 May 2020 05:13) (92% - 96%)  I&O's Summary    21 May 2020 07:01  -  22 May 2020 07:00  --------------------------------------------------------  IN: 0 mL / OUT: 750 mL / NET: -750 mL        PHYSICAL EXAM:  Constitutional: NAD  Neurological: Alert, no focal deficits  HEENT: no JVD, EOMI  Cardiovascular: Regular, S1 and S2, no murmur  Pulmonary: breath sounds bilaterally  Gastrointestinal: Bowel Sounds present, soft, nontender  EXT:  no peripheral edema  Skin: No rashes.  Psych:  Mood calm  LABS: All Labs Reviewed:                          10.4 7.95  )-----------( 281      ( 22 May 2020 08:40 )             34.5     05-22    137  |  100  |  26<H>  ----------------------------<  82  4.2   |  32<H>  |  1.00    Ca    9.4      22 May 2020 08:40    TPro  6.6  /  Alb  2.6<L>  /  TBili  0.7  /  DBili  x   /  AST  29  /  ALT  24  /  AlkPhos  86  05-22    · Assessment		  89M with known AF not on AC with h/o bleeding, HTN, HLD, CAD s/p TAVR admitted with inability to ambulate and severe RIGHT leg pain s/p fall 4 days PTA. Found with RIGHT hip fx. No planned surgery on the hip.    < from: TTE Echo Complete w/o contrast w/ Doppler (05.17.20 @ 10:15) >    Conclusion:   1. This a very technically limited study.  2. There is normal left ventricular size and left ventricular systolic function with an ejection fraction of 65-70%.  3. There is mild left ventricular hypertrophy with asymmetric septal hypertrophy.  4. There is significant mitral annular calcification with trivial mitral regurgitation. The mitral valvearea was 2.1 sq cm based on a mitral valve pressure half time of 90 ms.  5. There is diastolic dysfunction.  6. There is tricuspid annular calcification with mild tricuspid regurgitation and pacemaker wires visualized in the right atrium and right ventricle..  7. There is biatrial enlargement noted.  8. There is a bioprosthetic aortic valve noted. The mean gradient is 14.2 mmHg and the peak aortic valve gradient is 29.3 mmHg  9. There is no significant pericardial effusion.    < end of copied text >    CAD- asymptomatic, c/w metoprolol/lisinopril, lipitor and baby aspirin daily  TAVR- asymptomatic with ok valve gradients  HTN- controlled on above meds  A fib- rate controlled not on AC given history of bleeding and falls.  DVT prophylaxis  PT    No further inpatient cardiac work up  Pt to follow up with Dr. Lowery as outpatient in 4 weeks.  [phone]

## 2020-05-22 NOTE — PROGRESS NOTE ADULT - PROBLEM SELECTOR PLAN 1
May 20 and 15 - neg for covid -   CM following - DC planning - not optimized for custodial - as per CM notes  GOC documented - DNR DNI - MOLST filled out -   no surgical intervention planned as per Ortho - PT - and Rehab  right hip fx - OP - OA - Fall  cvs rx regimen  pain assessment  CXR clear   TTE pending - HFpEF and valv heart disease -   dvt p at present  s/p o2 support - I hanh -   labs and imaging reviewed -   discussed GOC with the patient -.

## 2020-05-23 ENCOUNTER — EMERGENCY (EMERGENCY)
Facility: HOSPITAL | Age: 85
LOS: 1 days | Discharge: HOME | End: 2020-05-23
Attending: EMERGENCY MEDICINE | Admitting: EMERGENCY MEDICINE
Payer: MEDICARE

## 2020-05-23 VITALS
HEART RATE: 74 BPM | OXYGEN SATURATION: 97 % | RESPIRATION RATE: 16 BRPM | HEIGHT: 68 IN | TEMPERATURE: 98 F | SYSTOLIC BLOOD PRESSURE: 112 MMHG | DIASTOLIC BLOOD PRESSURE: 63 MMHG

## 2020-05-23 VITALS
SYSTOLIC BLOOD PRESSURE: 110 MMHG | HEART RATE: 73 BPM | OXYGEN SATURATION: 100 % | DIASTOLIC BLOOD PRESSURE: 59 MMHG | TEMPERATURE: 98 F | RESPIRATION RATE: 16 BRPM

## 2020-05-23 LAB
ANION GAP SERPL CALC-SCNC: 6 MMOL/L — SIGNIFICANT CHANGE UP (ref 5–17)
APPEARANCE UR: CLEAR — SIGNIFICANT CHANGE UP
BILIRUB UR-MCNC: NEGATIVE — SIGNIFICANT CHANGE UP
BUN SERPL-MCNC: 37 MG/DL — HIGH (ref 7–23)
CALCIUM SERPL-MCNC: 9.3 MG/DL — SIGNIFICANT CHANGE UP (ref 8.5–10.1)
CHLORIDE SERPL-SCNC: 103 MMOL/L — SIGNIFICANT CHANGE UP (ref 96–108)
CO2 SERPL-SCNC: 29 MMOL/L — SIGNIFICANT CHANGE UP (ref 22–31)
COLOR SPEC: YELLOW — SIGNIFICANT CHANGE UP
CREAT SERPL-MCNC: 1.4 MG/DL — HIGH (ref 0.5–1.3)
DIFF PNL FLD: ABNORMAL
GLUCOSE SERPL-MCNC: 116 MG/DL — HIGH (ref 70–99)
GLUCOSE UR QL: NEGATIVE — SIGNIFICANT CHANGE UP
HCT VFR BLD CALC: 34 % — LOW (ref 39–50)
HGB BLD-MCNC: 10.5 G/DL — LOW (ref 13–17)
KETONES UR-MCNC: NEGATIVE — SIGNIFICANT CHANGE UP
LEUKOCYTE ESTERASE UR-ACNC: ABNORMAL
MCHC RBC-ENTMCNC: 28.7 PG — SIGNIFICANT CHANGE UP (ref 27–34)
MCHC RBC-ENTMCNC: 30.9 GM/DL — LOW (ref 32–36)
MCV RBC AUTO: 92.9 FL — SIGNIFICANT CHANGE UP (ref 80–100)
NITRITE UR-MCNC: NEGATIVE — SIGNIFICANT CHANGE UP
NRBC # BLD: 0 /100 WBCS — SIGNIFICANT CHANGE UP (ref 0–0)
PH UR: 5 — SIGNIFICANT CHANGE UP (ref 5–8)
PLATELET # BLD AUTO: 247 K/UL — SIGNIFICANT CHANGE UP (ref 150–400)
POTASSIUM SERPL-MCNC: 4.4 MMOL/L — SIGNIFICANT CHANGE UP (ref 3.5–5.3)
POTASSIUM SERPL-SCNC: 4.4 MMOL/L — SIGNIFICANT CHANGE UP (ref 3.5–5.3)
PROT UR-MCNC: 30 MG/DL
RBC # BLD: 3.66 M/UL — LOW (ref 4.2–5.8)
RBC # FLD: 15.2 % — HIGH (ref 10.3–14.5)
SARS-COV-2 RNA SPEC QL NAA+PROBE: SIGNIFICANT CHANGE UP
SODIUM SERPL-SCNC: 138 MMOL/L — SIGNIFICANT CHANGE UP (ref 135–145)
SP GR SPEC: 1.02 — SIGNIFICANT CHANGE UP (ref 1.01–1.02)
UROBILINOGEN FLD QL: NEGATIVE — SIGNIFICANT CHANGE UP
WBC # BLD: 10 K/UL — SIGNIFICANT CHANGE UP (ref 3.8–10.5)
WBC # FLD AUTO: 10 K/UL — SIGNIFICANT CHANGE UP (ref 3.8–10.5)

## 2020-05-23 PROCEDURE — 99284 EMERGENCY DEPT VISIT MOD MDM: CPT

## 2020-05-23 PROCEDURE — 81001 URINALYSIS AUTO W/SCOPE: CPT

## 2020-05-23 PROCEDURE — 85027 COMPLETE CBC AUTOMATED: CPT

## 2020-05-23 PROCEDURE — 87635 SARS-COV-2 COVID-19 AMP PRB: CPT

## 2020-05-23 PROCEDURE — 87086 URINE CULTURE/COLONY COUNT: CPT

## 2020-05-23 PROCEDURE — 80048 BASIC METABOLIC PNL TOTAL CA: CPT

## 2020-05-23 PROCEDURE — 87186 SC STD MICRODIL/AGAR DIL: CPT

## 2020-05-23 PROCEDURE — 36415 COLL VENOUS BLD VENIPUNCTURE: CPT

## 2020-05-23 RX ORDER — SODIUM CHLORIDE 9 MG/ML
1000 INJECTION INTRAMUSCULAR; INTRAVENOUS; SUBCUTANEOUS ONCE
Refills: 0 | Status: COMPLETED | OUTPATIENT
Start: 2020-05-23 | End: 2020-05-23

## 2020-05-23 RX ADMIN — SODIUM CHLORIDE 1000 MILLILITER(S): 9 INJECTION INTRAMUSCULAR; INTRAVENOUS; SUBCUTANEOUS at 15:00

## 2020-05-23 NOTE — ED ADULT NURSE REASSESSMENT NOTE - NS ED NURSE REASSESS COMMENT FT1
Report given to Randa at the [facility]. Called son, son concerned about guerra catheter present. Per urology note, guerra inserted by urology PA, per MD Jones do not remove guerra at this time. Spoke to son and advised for pt to follow up with his urologist. [facility] arrives for pt, pt dressed, IV removed. Pt has both hearing aides in ears and phone with him on discharge. All paperwork and belongings with EMS.

## 2020-05-23 NOTE — ED PROVIDER NOTE - CARE PROVIDER_API CALL
Tammy Garcia  INTERNAL MEDICINE  [address] [place] [id number]  Phone: [phone]  Fax: [phone]  Follow Up Time:

## 2020-05-23 NOTE — ED PROVIDER NOTE - PATIENT PORTAL LINK FT
You can access the FollowMyHealth Patient Portal offered by [facility] by registering at the following website: [email or web address]. By joining [facility]’s FollowMyHealth portal, you will also be able to view your health information using other applications (apps) compatible with our system.

## 2020-05-23 NOTE — ED PROVIDER NOTE - OBJECTIVE STATEMENT
89 yo white male resident of local [facility], sustained right lower extremity injury and was Dx with right greater tuberosity Fx and thus hospitalized. Was assessed by Palliative Care and decided to be DNR/DNI with limited intervention. Discharged yesterday and sent back today for weakness and fatigue. Patient though weak and slow in speech denies any chest pain, abdominal pains, nausea, vomiting, diarrhea or headache.

## 2020-05-23 NOTE — ED ADULT TRIAGE NOTE - CHIEF COMPLAINT QUOTE
brought in by EMS from [facility] for increased confusion and weakness. patient recently discharged from [facility] after admission. IV lock and IV fluids started by EMS for BP 90/50. DNR/DNI as per EZIO

## 2020-05-23 NOTE — ED ADULT NURSE NOTE - CHIEF COMPLAINT QUOTE
brought in by EMS from [facility] for increased confusion and weakness. patient recently discharged from [facility] after admission. IV lock and IV fluids started by EMS for BP 90/50. DNR/DNI as per EZIO

## 2020-05-28 NOTE — ED POST DISCHARGE NOTE - DETAILS
called [facility] phone lines and fax down. left message with matthew couhc to call back ed spoke to matthew WEEMS at [facility]. sent abx augmentin to pharmacy

## 2020-06-02 PROCEDURE — 86900 BLOOD TYPING SEROLOGIC ABO: CPT

## 2020-06-02 PROCEDURE — 86850 RBC ANTIBODY SCREEN: CPT

## 2020-06-02 PROCEDURE — 97535 SELF CARE MNGMENT TRAINING: CPT

## 2020-06-02 PROCEDURE — 73721 MRI JNT OF LWR EXTRE W/O DYE: CPT

## 2020-06-02 PROCEDURE — 93005 ELECTROCARDIOGRAM TRACING: CPT

## 2020-06-02 PROCEDURE — 97530 THERAPEUTIC ACTIVITIES: CPT

## 2020-06-02 PROCEDURE — 86901 BLOOD TYPING SEROLOGIC RH(D): CPT

## 2020-06-02 PROCEDURE — 73562 X-RAY EXAM OF KNEE 3: CPT

## 2020-06-02 PROCEDURE — 87635 SARS-COV-2 COVID-19 AMP PRB: CPT

## 2020-06-02 PROCEDURE — 97165 OT EVAL LOW COMPLEX 30 MIN: CPT

## 2020-06-02 PROCEDURE — 97116 GAIT TRAINING THERAPY: CPT

## 2020-06-02 PROCEDURE — 72192 CT PELVIS W/O DYE: CPT

## 2020-06-02 PROCEDURE — 73502 X-RAY EXAM HIP UNI 2-3 VIEWS: CPT

## 2020-06-02 PROCEDURE — 97161 PT EVAL LOW COMPLEX 20 MIN: CPT

## 2020-06-02 PROCEDURE — 93308 TTE F-UP OR LMTD: CPT

## 2020-06-02 PROCEDURE — 85730 THROMBOPLASTIN TIME PARTIAL: CPT

## 2020-06-02 PROCEDURE — 97110 THERAPEUTIC EXERCISES: CPT

## 2020-06-02 PROCEDURE — 93306 TTE W/DOPPLER COMPLETE: CPT

## 2020-06-02 PROCEDURE — 36415 COLL VENOUS BLD VENIPUNCTURE: CPT

## 2020-06-02 PROCEDURE — 80048 BASIC METABOLIC PNL TOTAL CA: CPT

## 2020-06-02 PROCEDURE — 71045 X-RAY EXAM CHEST 1 VIEW: CPT

## 2020-06-02 PROCEDURE — 80053 COMPREHEN METABOLIC PANEL: CPT

## 2020-06-02 PROCEDURE — 85027 COMPLETE CBC AUTOMATED: CPT

## 2020-06-02 PROCEDURE — 99285 EMERGENCY DEPT VISIT HI MDM: CPT | Mod: 25

## 2020-06-02 PROCEDURE — 85610 PROTHROMBIN TIME: CPT

## 2020-06-06 ENCOUNTER — TRANSCRIPTION ENCOUNTER (OUTPATIENT)
Age: 85
End: 2020-06-06

## 2020-09-01 ENCOUNTER — NON-APPOINTMENT (OUTPATIENT)
Age: 85
End: 2020-09-01

## 2020-09-01 ENCOUNTER — APPOINTMENT (OUTPATIENT)
Dept: OPHTHALMOLOGY | Facility: CLINIC | Age: 85
End: 2020-09-01
Payer: MEDICARE

## 2020-09-01 PROCEDURE — 92015 DETERMINE REFRACTIVE STATE: CPT

## 2020-09-01 PROCEDURE — 92012 INTRM OPH EXAM EST PATIENT: CPT

## 2020-12-08 ENCOUNTER — EMERGENCY (EMERGENCY)
Facility: HOSPITAL | Age: 85
LOS: 1 days | Discharge: NURSING, REHAB OR LONG-TERM CARE | End: 2020-12-08
Attending: EMERGENCY MEDICINE | Admitting: EMERGENCY MEDICINE
Payer: MEDICARE

## 2020-12-08 VITALS
OXYGEN SATURATION: 95 % | HEIGHT: 68 IN | RESPIRATION RATE: 17 BRPM | DIASTOLIC BLOOD PRESSURE: 47 MMHG | HEART RATE: 82 BPM | TEMPERATURE: 98 F | SYSTOLIC BLOOD PRESSURE: 99 MMHG | WEIGHT: 175.05 LBS

## 2020-12-08 VITALS
RESPIRATION RATE: 16 BRPM | SYSTOLIC BLOOD PRESSURE: 107 MMHG | DIASTOLIC BLOOD PRESSURE: 60 MMHG | TEMPERATURE: 98 F | OXYGEN SATURATION: 98 % | HEART RATE: 77 BPM

## 2020-12-08 LAB
APPEARANCE UR: ABNORMAL
BILIRUB UR-MCNC: NEGATIVE — SIGNIFICANT CHANGE UP
COLOR SPEC: ABNORMAL
DIFF PNL FLD: ABNORMAL
GLUCOSE UR QL: NEGATIVE MG/DL — SIGNIFICANT CHANGE UP
KETONES UR-MCNC: ABNORMAL
LEUKOCYTE ESTERASE UR-ACNC: ABNORMAL
NITRITE UR-MCNC: NEGATIVE — SIGNIFICANT CHANGE UP
PH UR: 5 — SIGNIFICANT CHANGE UP (ref 5–8)
PROT UR-MCNC: 500 MG/DL
SP GR SPEC: 1.01 — SIGNIFICANT CHANGE UP (ref 1.01–1.02)
UROBILINOGEN FLD QL: NEGATIVE MG/DL — SIGNIFICANT CHANGE UP

## 2020-12-08 PROCEDURE — 51702 INSERT TEMP BLADDER CATH: CPT

## 2020-12-08 PROCEDURE — 81001 URINALYSIS AUTO W/SCOPE: CPT

## 2020-12-08 PROCEDURE — 99283 EMERGENCY DEPT VISIT LOW MDM: CPT | Mod: 25

## 2020-12-08 PROCEDURE — 87086 URINE CULTURE/COLONY COUNT: CPT

## 2020-12-08 RX ORDER — CEFUROXIME AXETIL 250 MG
500 TABLET ORAL ONCE
Refills: 0 | Status: COMPLETED | OUTPATIENT
Start: 2020-12-08 | End: 2020-12-08

## 2020-12-08 RX ORDER — CEFUROXIME AXETIL 250 MG
1 TABLET ORAL
Qty: 14 | Refills: 0
Start: 2020-12-08 | End: 2020-12-14

## 2020-12-08 RX ADMIN — Medication 500 MILLIGRAM(S): at 22:41

## 2020-12-08 NOTE — ED ADULT NURSE NOTE - NSIMPLEMENTINTERV_GEN_ALL_ED
Implemented All Fall with Harm Risk Interventions:  [place] to call system. Call bell, personal items and telephone within reach. Instruct patient to call for assistance. Room bathroom lighting operational. Non-slip footwear when patient is off stretcher. Physically safe environment: no spills, clutter or unnecessary equipment. Stretcher in lowest position, wheels locked, appropriate side rails in place. Provide visual cue, wrist band, yellow gown, etc. Monitor gait and stability. Monitor for mental status changes and reorient to person, place, and time. Review medications for side effects contributing to fall risk. Reinforce activity limits and safety measures with patient and family. Provide visual clues: red socks.

## 2020-12-08 NOTE — ED ADULT NURSE NOTE - OBJECTIVE STATEMENT
pt comes from [facility] for guerra cath complications. AOx1, hx of dementia, guerra was removed by patient at facility, no attempts were made to replace Guerra, blood was at the meatus which warranted them to tx pt to ED. pt has bandage to right lower leg with dried scabs and scattered bruises in different stages of healing. stg 3 ulcer on sacrum. Guerra changed in ED by Dr. Arroyo. + urine output. motion in extremities is limited, unable to answer further questions due to dementia.

## 2020-12-08 NOTE — ED PROVIDER NOTE - NSFOLLOWUPINSTRUCTIONS_ED_ALL_ED_FT
Michael Catheter Placement and Care    WHAT YOU NEED TO KNOW:    A Michael catheter is a sterile tube that is inserted into your bladder to drain urine. It is also called an indwelling urinary catheter.     DISCHARGE INSTRUCTIONS:    Return to the emergency department if:   •Your catheter comes out.       •You suddenly have material that looks like sand in the tubing or drainage bag.      •No urine is draining into the bag and you have checked the system.      •You have pain in your hip, back, pelvis, or lower abdomen.      •You are confused or cannot think clearly.      Call your doctor or urologist if:   •You have a fever.      •You have bladder spasms for more than 1 day after the catheter is placed.      •You see blood in the tubing or drainage bag.      •You have a rash or itching where the catheter tube is secured to your skin.      •Urine leaks from or around the catheter, tubing, or drainage bag.      •The closed drainage system has accidently come open or apart.       •You see a layer of crystals inside the tubing.      •You have questions or concerns about your condition or care.      Care for your catheter and drainage bag: You can reduce your risk for infection and injury by caring for your catheter and drainage bag properly.  •Wash your hands often. Wash before and after you touch your catheter, tubing, or drainage bag. Use soap and water. Wear clean disposable gloves when you care for your catheter or disconnect the drainage bag. Wash your hands before you prepare or eat food.   Handwashing           •Clean your genital area 2 times every day. Clean your catheter area and anal opening after every bowel movement. ?For men: Use a soapy cloth to clean the tip of your penis. Start where the catheter enters. Wipe backward making sure to pull back the foreskin. Then use a cloth with clear water in the same direction to clean away the soap.       ?For women: Use a soapy cloth to clean the area that the catheter enters your body. Make sure to separate your labia and wipe toward the anus. Then use a cloth with clear water and wipe in the same direction.       •Secure the catheter tube so you do not pull or move the catheter. This helps prevent pain and bladder spasms. Healthcare providers will show you how to use medical tape or a strap to secure the catheter tube to your body.       •Keep a closed drainage system. Your catheter should always be attached to the drainage bag to form a closed system. Do not disconnect any part of the closed system unless you need to change the bag.      •Keep the drainage bag below the level of your waist. This helps stop urine from moving back up the tubing and into your bladder. Do not loop or kink the tubing. This can cause urine to back up and collect in your bladder. Do not let the drainage bag touch or lie on the floor.      •Empty the drainage bag when needed. The weight of a full drainage bag can be painful. Empty the drainage bag every 3 to 6 hours or when it is ? full.       •Clean and change the drainage bag as directed. Ask your healthcare provider how often you should change the drainage bag and what cleaning solution to use. Wear disposable gloves when you change the bag. Do not allow the end of the catheter or tubing to touch anything. Clean the ends with an alcohol pad before you reconnect them.      What to do if problems develop:   •No urine is draining into the bag: ?Check for kinks in the tubing and straighten them out.       ?Check the tape or strap used to secure the catheter tube to your skin. Make sure it is not blocking the tube.       ?Make sure you are not sitting or lying on the tubing.      ?Make sure the urine bag is hanging below the level of your waist.      •Urine leaks from or around the catheter, tubing, or drainage bag: Check if the closed drainage system has accidently come open or apart. Clean the catheter and tubing ends with a new alcohol pad and reconnect them.       Follow up with your doctor or urologist as directed: Write down your questions so you remember to ask them during your visits.

## 2020-12-08 NOTE — ED PROVIDER NOTE - PATIENT PORTAL LINK FT
You can access the FollowMyHealth Patient Portal offered by [facility] by registering at the following website: [email or web address]. By joining [employer] [facility]’s FollowMyHealth portal, you will also be able to view your health information using other applications (apps) compatible with our system.

## 2020-12-08 NOTE — ED PROVIDER NOTE - OBJECTIVE STATEMENT
89yo male bib ems from assisted living after guerra was pulled out, the aid could not replace it, states there were clots at the meatus and no attempts were made, no vomiting or fever, pt has dementia so hx is unable to be obtained from pt

## 2020-12-12 LAB
CULTURE RESULTS: SIGNIFICANT CHANGE UP
SPECIMEN SOURCE: SIGNIFICANT CHANGE UP

## 2020-12-13 NOTE — ED POST DISCHARGE NOTE - DETAILS
Discussed with pt family member Odilia. Discussed with Dimple from [facility] in which reports patient switched to Bactrim recently by PCP. Discussed UCx findings, faxed results to [phone].

## 2021-11-29 NOTE — H&P ADULT - ASSESSMENT
89 y/o male with PMHx Anxiety, Atrial fibrillation, CAD, High cholesterol, HTN BIBA from [facility] due to right leg pain. Pt reports fall 4 days ago. Pt reports he is unable to ambulate due to pain. Pt also reports new knee pain, no recent injury. Pt notes he uses a walker at home but unable to bear weight. pt describes pain as aching, non-radiating, and currently 3/10 at rest. pt denies recent fall, fever, chills, cp, SOB, numbness/weakness, cough, HA, or any other complaints.
none
29-Sep-2021

## 2022-03-27 NOTE — PROGRESS NOTE ADULT - SUBJECTIVE AND OBJECTIVE BOX
Discharge instructions discussed and all questions answered with parents. ID bands verified. Placed in car seat by parent prior to discharge. Parents confident in caring for baby. Discharged home in stable condition. Date/Time Patient Seen:  		  Referring MD:   Data Reviewed	       Patient is a 89y old  Male who presents with a chief complaint of fall (20 May 2020 10:26)      Subjective/HPI     PAST MEDICAL & SURGICAL HISTORY:  HTN (hypertension)  Atrial fibrillation  CAD (coronary artery disease)  High cholesterol  Anxiety  No significant past surgical history        Medication list         MEDICATIONS  (STANDING):  aspirin  chewable 81 milliGRAM(s) Oral daily  atorvastatin 40 milliGRAM(s) Oral at bedtime  finasteride 5 milliGRAM(s) Oral daily  furosemide    Tablet 40 milliGRAM(s) Oral daily  heparin   Injectable 5000 Unit(s) SubCutaneous every 8 hours  lisinopril 5 milliGRAM(s) Oral daily  metoprolol succinate ER 25 milliGRAM(s) Oral daily  senna 2 Tablet(s) Oral at bedtime    MEDICATIONS  (PRN):  acetaminophen   Tablet .. 650 milliGRAM(s) Oral every 6 hours PRN Temp greater or equal to 38C (100.4F), Mild Pain (1 - 3)  ALPRAZolam 0.25 milliGRAM(s) Oral daily PRN anxiety  morphine  - Injectable 2 milliGRAM(s) IV Push every 6 hours PRN Severe Pain (7 - 10)  oxycodone    5 mG/acetaminophen 325 mG 1 Tablet(s) Oral every 6 hours PRN Moderate Pain (4 - 6)  zolpidem 5 milliGRAM(s) Oral at bedtime PRN Insomnia         Vitals log        ICU Vital Signs Last 24 Hrs  T(C): 36.6 (22 May 2020 05:13), Max: 36.6 (21 May 2020 20:58)  T(F): 97.9 (22 May 2020 05:13), Max: 97.9 (22 May 2020 05:13)  HR: 95 (22 May 2020 05:13) (62 - 95)  BP: 115/73 (22 May 2020 05:13) (97/59 - 115/73)  BP(mean): --  ABP: --  ABP(mean): --  RR: 18 (22 May 2020 05:13) (16 - 18)  SpO2: 92% (22 May 2020 05:13) (92% - 96%)           Input and Output:  I&O's Detail    20 May 2020 07:01  -  21 May 2020 07:00  --------------------------------------------------------  IN:  Total IN: 0 mL    OUT:    Indwelling Catheter - Urethral: 1250 mL  Total OUT: 1250 mL    Total NET: -1250 mL      21 May 2020 07:01  -  22 May 2020 06:09  --------------------------------------------------------  IN:  Total IN: 0 mL    OUT:    Indwelling Catheter - Urethral: 750 mL  Total OUT: 750 mL    Total NET: -750 mL          Lab Data                  Review of Systems	      Objective     Physical Examination    heart s1s2  lung dec BS  abd soft      Pertinent Lab findings & Imaging      Alec:  NO   Adequate UO     I&O's Detail    20 May 2020 07:01  -  21 May 2020 07:00  --------------------------------------------------------  IN:  Total IN: 0 mL    OUT:    Indwelling Catheter - Urethral: 1250 mL  Total OUT: 1250 mL    Total NET: -1250 mL      21 May 2020 07:01  -  22 May 2020 06:09  --------------------------------------------------------  IN:  Total IN: 0 mL    OUT:    Indwelling Catheter - Urethral: 750 mL  Total OUT: 750 mL    Total NET: -750 mL               Discussed with:     Cultures:	        Radiology

## 2022-12-20 NOTE — ED ADULT TRIAGE NOTE - CCCP TRG CHIEF CMPLNT
weakness Bcc  Nodular Histology Text: Emanating from the epidermis and infiltrating the\\ndermis are irregularly shaped islands of basaloid keratinocytes. The cells have scant\\ncytoplasm and round dark nuclei. Mitotic figures and apoptotic bodies are evident. The\\nnuclei at the periphery of the islands have a palisaded arrangement. The islands are\\nassociated with a fibromyxoid stroma and there is cleft formation between some of the\\nislands and stroma.

## 2023-07-10 NOTE — ED PROVIDER NOTE - INGUINAL REGION
no swelling Niacinamide Pregnancy And Lactation Text: These medications are considered safe during pregnancy.

## 2023-12-06 NOTE — H&P ADULT - HISTORY OF PRESENT ILLNESS
How Severe Is It?: moderate
89 y/o male with PMHx Anxiety, Atrial fibrillation, CAD, High cholesterol, HTN BIBA from [place] due to right leg pain. Pt reports fall 4 days ago. Pt reports he is unable to ambulate due to pain. Pt also reports new knee pain, no recent injury. Pt notes he uses a walker at home but unable to bear weight. pt describes pain as aching, non-radiating, and currently 3/10 at rest. pt denies recent fall, fever, chills, cp, SOB, numbness/weakness, cough, HA, or any other complaints.
Is This A New Presentation, Or A Follow-Up?: Follow Up Isotretinoin
Display Cumulative Dose In The Note?: Yes
Patient Reported Weight In Pounds (Required For Calculation): 140
When Was Isotretinoin Started?: 11/2023

## 2025-06-19 NOTE — ED PROVIDER NOTE - ATTENDING CONTRIBUTION TO CARE
Spoke to pt she has a gyn appt today and will go from there.   I have personally performed a face to face diagnostic evaluation on this patient.  I have reviewed the PA note and agree with the history, exam, and plan of care, except as noted.  History and Exam by me shows  bibems from [place], got up to grab walker and got dizzy, fell, complaining right hip "tightness" and right forearm skin tear.  pt is nad.  alert and oriented x 3.  head nc, right arm- skin tear c from actively.  right hip - nt, from.  ct, xrs, and lab cw dehydration.   dc home p iv  hydration.